# Patient Record
Sex: FEMALE | Race: WHITE | NOT HISPANIC OR LATINO | Employment: OTHER | URBAN - NONMETROPOLITAN AREA
[De-identification: names, ages, dates, MRNs, and addresses within clinical notes are randomized per-mention and may not be internally consistent; named-entity substitution may affect disease eponyms.]

---

## 2022-04-08 ENCOUNTER — TELEPHONE (OUTPATIENT)
Dept: UROLOGY | Facility: CLINIC | Age: 77
End: 2022-04-08

## 2023-08-11 DIAGNOSIS — G89.4 CHRONIC PAIN SYNDROME: Primary | ICD-10-CM

## 2023-08-11 RX ORDER — HYDROCODONE BITARTRATE AND ACETAMINOPHEN 5; 325 MG/1; MG/1
1 TABLET ORAL EVERY 4 HOURS PRN
Qty: 120 TABLET | Refills: 0 | Status: SHIPPED | OUTPATIENT
Start: 2023-08-11

## 2023-08-11 RX ORDER — GABAPENTIN 100 MG/1
100 CAPSULE ORAL EVERY 8 HOURS
Qty: 90 CAPSULE | Refills: 5 | Status: SHIPPED | OUTPATIENT
Start: 2023-08-11

## 2023-08-11 RX ORDER — LORAZEPAM 0.5 MG/1
0.5 TABLET ORAL EVERY 8 HOURS PRN
Qty: 90 TABLET | Refills: 0 | Status: SHIPPED | OUTPATIENT
Start: 2023-08-11

## 2023-08-15 ENCOUNTER — NURSING HOME (OUTPATIENT)
Dept: INTERNAL MEDICINE | Facility: CLINIC | Age: 78
End: 2023-08-15
Payer: MEDICARE

## 2023-08-15 DIAGNOSIS — S72.002S: ICD-10-CM

## 2023-08-15 DIAGNOSIS — F41.1 ANXIETY, GENERALIZED: ICD-10-CM

## 2023-08-15 DIAGNOSIS — D51.9 ANEMIA DUE TO VITAMIN B12 DEFICIENCY, UNSPECIFIED B12 DEFICIENCY TYPE: ICD-10-CM

## 2023-08-15 DIAGNOSIS — D51.8 VITAMIN B12 DEFICIENCY (DIETARY) ANEMIA: ICD-10-CM

## 2023-08-15 DIAGNOSIS — G60.9 PERIPHERAL NEUROPATHY, IDIOPATHIC: ICD-10-CM

## 2023-08-15 DIAGNOSIS — N30.00 ACUTE CYSTITIS WITHOUT HEMATURIA: ICD-10-CM
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
## 2023-08-15 DIAGNOSIS — G89.4 CHRONIC PAIN SYNDROME: Primary | ICD-10-CM

## 2023-08-15 DIAGNOSIS — F33.1 MAJOR DEPRESSIVE DISORDER, RECURRENT, MODERATE: ICD-10-CM

## 2023-08-15 DIAGNOSIS — E55.9 VITAMIN D DEFICIENCY: ICD-10-CM

## 2023-08-15 DIAGNOSIS — I10 HYPERTENSION, ESSENTIAL: ICD-10-CM
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
## 2023-08-15 NOTE — PROGRESS NOTES
Nursing Home History and Physical Note      Kristian Balderas MD  [x]  [address], [address]  [place] [address]  Phone: [phone]  Fax: [phone]     PATIENT NAME: Ying Brock                                                                          YOB: 1945            DATE OF SERVICE: 08/15/2023  FACILITY:   [] [facility]   [x] [facility]  [] [facility]  [] Other      HISTORY OF PRESENT ILLNESS: Patient is a pleasant 78-year-old female whose a fairly good historian but she is on sure of certain dates, she says she is fallen twice and she ended up having a hip fracture with repair she went home and had another fall and bruised her left hip really bad, she also has some bruises on her face her legs and her arms, she tells me she has a PICC line in because of her bladder infection, she says she lives with her daughter and her family, she tells me she used to smoke and drink some beer but she does not do that anymore      PAST MEDICAL & SURGICAL HISTORY:   Breast cancer, right mastectomy  Hypertension  Anxiety depression  Hypertension  Recent left total hip arthroplasty  Previous cholecystectomy,  Recurrent falls  UTIs    MEDICATIONS:  I have reviewed and reconciled the patients medication list in the patients chart at the skilled nursing facility today.      ALLERGIES:  No known drug allergies     SOCIAL HISTORY:  Social History     Socioeconomic History    Marital status:    , She lives with her daughter and family she does not smoke or drink currently, she is     FAMILY HISTORY: Family history reviewed but not contributory to this admission      PHYSICAL EXAMINATION:   VITAL SIGNS: 130/72 sat 95% on room air pulse 72 respiratory rate 18 temperature 97.2    PHYSICAL EXAM:, She is a frail elderly appearing female who is in no distress she is talkative alert but her history is poor, she has  multiple bruises on her upper extremities bilaterally she has bruising to the left hip and left lateral thigh area, she has a bandage over the left lateral upper thigh just below the iliac crest on the left side, she has bruises to her left knee area and leg, her lungs are clear with a few scattered crackles her cardiac exam reveals a regular rhythm she is alert and oriented x2 she can move all 4 extremities to my command her abdomen is soft nontender throat is clear neck is supple, she is PET pleasant but slightly confused with some of her history      RECORDS REVIEW:   Orders Reviewed.  Labs Reviewed.      ICD-10-CM ICD-9-CM   1. Chronic pain syndrome  G89.4 338.4   2. Fall, sequela  W19.XXXS 909.4     E929.3   3. Fracture, hip, left, sequela  S72.002S 905.3   4. Hypertension, essential  I10 401.9   5. VIT D DEF  E55.9 268.9   6. Vitamin B12 deficiency (dietary) anemia  D51.8 281.1   7. JOSE LUIS NEUR IDOPA  G60.9 356.9   8. Acute cystitis without hematuria  N30.00 595.0   9. Major depressive disorder, recurrent, moderate  F33.1 296.32   10. Anxiety, generalized  F41.1 300.02   11. Anemia due to vitamin B12 deficiency, unspecified B12 deficiency type  D51.9 281.1       ASSESSMENT/PLAN    Diagnoses and all orders for this visit:    1. Chronic pain syndrome (Primary)    2. Fall, sequela    3. Fracture, hip, left, sequela    4. Hypertension, essential    5. VIT D DEF    6. Vitamin B12 deficiency (dietary) anemia    7. JOSE LUIS NEUR IDOPA    8. Acute cystitis without hematuria    9. Major depressive disorder, recurrent, moderate    10. Anxiety, generalized    11. Anemia due to vitamin B12 deficiency, unspecified B12 deficiency type    Total hip arthroplasty left    Anemia, hemoglobin 9.7 hematocrit 28.8 August 10, 2023,    Severe vitamin B12 deficiency, will replace has been started on B12 injections monthly,    UTI, with PICC line in place finishing up course of antibiotic ertapenem to stop August 18, 2023    Anxiety  depression continues pRosac 20 mg daily, Ativan 0.5 mg every 8 hours as needed for the next couple days supposed to stop as of today August 15, 2023    Peripheral neuropathy continues on gabapentin 100 mg every 8 hours    Vitamin D deficiency moderate to severe we will replace continues on vitamin D replacement now    Falls, recurrent, previous hip fracture and repair, continues on hydrocodone every 4 hours as needed    Hypertension continues on lisinopril 40 mg daily, metoprolol 25 mg half a tablet twice a day    Previous cholecystectomy, mastectomy right sided, breast cancer    Remote tobacco and alcohol use    Pain is continue rehab supplement her vitamin deficiencies monitor her electrolytes, we get her off medication that she does not need such as her gabapentin,    Kristian Balderas MD

## 2023-08-28 ENCOUNTER — APPOINTMENT (OUTPATIENT)
Dept: GENERAL RADIOLOGY | Facility: HOSPITAL | Age: 78
DRG: 551 | End: 2023-08-28
Payer: MEDICARE

## 2023-08-28 ENCOUNTER — APPOINTMENT (OUTPATIENT)
Dept: CT IMAGING | Facility: HOSPITAL | Age: 78
DRG: 551 | End: 2023-08-28
Payer: MEDICARE

## 2023-08-28 ENCOUNTER — HOSPITAL ENCOUNTER (INPATIENT)
Facility: HOSPITAL | Age: 78
LOS: 3 days | Discharge: NURSING, REHAB OR LONG-TERM CARE | DRG: 551 | End: 2023-08-31
Attending: EMERGENCY MEDICINE | Admitting: INTERNAL MEDICINE
Payer: MEDICARE

## 2023-08-28 DIAGNOSIS — Z78.9 DECREASED ACTIVITIES OF DAILY LIVING (ADL): ICD-10-CM

## 2023-08-28 DIAGNOSIS — R26.2 DIFFICULTY WALKING: ICD-10-CM

## 2023-08-28 DIAGNOSIS — S32.10XA CLOSED FRACTURE OF SACRUM, UNSPECIFIED PORTION OF SACRUM, INITIAL ENCOUNTER: ICD-10-CM
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
## 2023-08-28 DIAGNOSIS — R41.82 ALTERED MENTAL STATUS, UNSPECIFIED ALTERED MENTAL STATUS TYPE: Primary | ICD-10-CM

## 2023-08-28 LAB
ALBUMIN SERPL-MCNC: 4.4 G/DL (ref 3.5–5.2)
ALBUMIN/GLOB SERPL: 1.4 G/DL
ALP SERPL-CCNC: 171 U/L (ref 39–117)
ALT SERPL W P-5'-P-CCNC: 9 U/L (ref 1–33)
ANION GAP SERPL CALCULATED.3IONS-SCNC: 9.1 MMOL/L (ref 5–15)
APTT PPP: 28.5 SECONDS (ref 78–95.9)
AST SERPL-CCNC: 19 U/L (ref 1–32)
BACTERIA UR QL AUTO: ABNORMAL /HPF
BASOPHILS # BLD AUTO: 0.06 10*3/MM3 (ref 0–0.2)
BASOPHILS NFR BLD AUTO: 0.9 % (ref 0–1.5)
BILIRUB SERPL-MCNC: 1.2 MG/DL (ref 0–1.2)
BILIRUB UR QL STRIP: NEGATIVE
BUN SERPL-MCNC: 28 MG/DL (ref 8–23)
BUN/CREAT SERPL: 25.5 (ref 7–25)
CALCIUM SPEC-SCNC: 9.5 MG/DL (ref 8.6–10.5)
CHLORIDE SERPL-SCNC: 101 MMOL/L (ref 98–107)
CLARITY UR: CLEAR
CO2 SERPL-SCNC: 26.9 MMOL/L (ref 22–29)
COLOR UR: YELLOW
CREAT SERPL-MCNC: 1.1 MG/DL (ref 0.57–1)
D-LACTATE SERPL-SCNC: 0.9 MMOL/L (ref 0.5–2)
DEPRECATED RDW RBC AUTO: 44.5 FL (ref 37–54)
EGFRCR SERPLBLD CKD-EPI 2021: 52.2 ML/MIN/1.73
EOSINOPHIL # BLD AUTO: 0.31 10*3/MM3 (ref 0–0.4)
EOSINOPHIL NFR BLD AUTO: 4.6 % (ref 0.3–6.2)
ERYTHROCYTE [DISTWIDTH] IN BLOOD BY AUTOMATED COUNT: 13.1 % (ref 12.3–15.4)
GEN 5 2HR TROPONIN T REFLEX: 18 NG/L
GLOBULIN UR ELPH-MCNC: 3.1 GM/DL
GLUCOSE SERPL-MCNC: 101 MG/DL (ref 65–99)
GLUCOSE UR STRIP-MCNC: NEGATIVE MG/DL
HCT VFR BLD AUTO: 34.8 % (ref 34–46.6)
HGB BLD-MCNC: 12.1 G/DL (ref 12–15.9)
HGB UR QL STRIP.AUTO: NEGATIVE
HOLD SPECIMEN: NORMAL
HOLD SPECIMEN: NORMAL
HYALINE CASTS UR QL AUTO: ABNORMAL /LPF
IMM GRANULOCYTES # BLD AUTO: 0.01 10*3/MM3 (ref 0–0.05)
IMM GRANULOCYTES NFR BLD AUTO: 0.1 % (ref 0–0.5)
INR PPP: 1.03 (ref 0.86–1.15)
KETONES UR QL STRIP: NEGATIVE
LEUKOCYTE ESTERASE UR QL STRIP.AUTO: ABNORMAL
LIPASE SERPL-CCNC: 27 U/L (ref 13–60)
LYMPHOCYTES # BLD AUTO: 1.69 10*3/MM3 (ref 0.7–3.1)
LYMPHOCYTES NFR BLD AUTO: 24.9 % (ref 19.6–45.3)
MCH RBC QN AUTO: 32.4 PG (ref 26.6–33)
MCHC RBC AUTO-ENTMCNC: 34.8 G/DL (ref 31.5–35.7)
MCV RBC AUTO: 93.3 FL (ref 79–97)
MONOCYTES # BLD AUTO: 0.51 10*3/MM3 (ref 0.1–0.9)
MONOCYTES NFR BLD AUTO: 7.5 % (ref 5–12)
NEUTROPHILS NFR BLD AUTO: 4.2 10*3/MM3 (ref 1.7–7)
NEUTROPHILS NFR BLD AUTO: 62 % (ref 42.7–76)
NITRITE UR QL STRIP: NEGATIVE
NRBC BLD AUTO-RTO: 0 /100 WBC (ref 0–0.2)
NT-PROBNP SERPL-MCNC: 455.1 PG/ML (ref 0–1800)
PH UR STRIP.AUTO: 5.5 [PH] (ref 5–8)
PLATELET # BLD AUTO: 195 10*3/MM3 (ref 140–450)
PMV BLD AUTO: 8.5 FL (ref 6–12)
POTASSIUM SERPL-SCNC: 4.4 MMOL/L (ref 3.5–5.2)
PROT SERPL-MCNC: 7.5 G/DL (ref 6–8.5)
PROT UR QL STRIP: NEGATIVE
PROTHROMBIN TIME: 13.6 SECONDS (ref 11.8–14.9)
RBC # BLD AUTO: 3.73 10*6/MM3 (ref 3.77–5.28)
RBC # UR STRIP: ABNORMAL /HPF
REF LAB TEST METHOD: ABNORMAL
SODIUM SERPL-SCNC: 137 MMOL/L (ref 136–145)
SP GR UR STRIP: 1.01 (ref 1–1.03)
SQUAMOUS #/AREA URNS HPF: ABNORMAL /HPF
TROPONIN T DELTA: 2 NG/L
TROPONIN T SERPL HS-MCNC: 16 NG/L
UROBILINOGEN UR QL STRIP: ABNORMAL
WBC # UR STRIP: ABNORMAL /HPF
WBC NRBC COR # BLD: 6.78 10*3/MM3 (ref 3.4–10.8)
WHOLE BLOOD HOLD COAG: NORMAL
WHOLE BLOOD HOLD SPECIMEN: NORMAL

## 2023-08-28 PROCEDURE — 85610 PROTHROMBIN TIME: CPT | Performed by: NURSE PRACTITIONER

## 2023-08-28 PROCEDURE — 81001 URINALYSIS AUTO W/SCOPE: CPT

## 2023-08-28 PROCEDURE — 85025 COMPLETE CBC W/AUTO DIFF WBC: CPT | Performed by: EMERGENCY MEDICINE

## 2023-08-28 PROCEDURE — 99285 EMERGENCY DEPT VISIT HI MDM: CPT

## 2023-08-28 PROCEDURE — 73502 X-RAY EXAM HIP UNI 2-3 VIEWS: CPT

## 2023-08-28 PROCEDURE — 80053 COMPREHEN METABOLIC PANEL: CPT | Performed by: NURSE PRACTITIONER

## 2023-08-28 PROCEDURE — 83690 ASSAY OF LIPASE: CPT | Performed by: NURSE PRACTITIONER

## 2023-08-28 PROCEDURE — 73130 X-RAY EXAM OF HAND: CPT

## 2023-08-28 PROCEDURE — 70450 CT HEAD/BRAIN W/O DYE: CPT

## 2023-08-28 PROCEDURE — 85730 THROMBOPLASTIN TIME PARTIAL: CPT | Performed by: NURSE PRACTITIONER

## 2023-08-28 PROCEDURE — 73030 X-RAY EXAM OF SHOULDER: CPT

## 2023-08-28 PROCEDURE — 84484 ASSAY OF TROPONIN QUANT: CPT | Performed by: EMERGENCY MEDICINE

## 2023-08-28 PROCEDURE — 83880 ASSAY OF NATRIURETIC PEPTIDE: CPT | Performed by: NURSE PRACTITIONER

## 2023-08-28 PROCEDURE — 25010000002 ENOXAPARIN PER 10 MG: Performed by: INTERNAL MEDICINE

## 2023-08-28 PROCEDURE — 83605 ASSAY OF LACTIC ACID: CPT | Performed by: NURSE PRACTITIONER

## 2023-08-28 PROCEDURE — 72192 CT PELVIS W/O DYE: CPT

## 2023-08-28 PROCEDURE — 73110 X-RAY EXAM OF WRIST: CPT

## 2023-08-28 PROCEDURE — 93005 ELECTROCARDIOGRAM TRACING: CPT | Performed by: EMERGENCY MEDICINE

## 2023-08-28 PROCEDURE — 71046 X-RAY EXAM CHEST 2 VIEWS: CPT

## 2023-08-28 PROCEDURE — 36415 COLL VENOUS BLD VENIPUNCTURE: CPT

## 2023-08-28 RX ORDER — CHOLESTYRAMINE LIGHT 4 G/5.7G
4 POWDER, FOR SUSPENSION ORAL DAILY
COMMUNITY

## 2023-08-28 RX ORDER — SODIUM CHLORIDE 0.9 % (FLUSH) 0.9 %
10 SYRINGE (ML) INJECTION AS NEEDED
Status: DISCONTINUED | OUTPATIENT
Start: 2023-08-28 | End: 2023-08-31 | Stop reason: HOSPADM

## 2023-08-28 RX ORDER — ACETAMINOPHEN 325 MG/1
650 TABLET ORAL EVERY 4 HOURS PRN
Status: DISCONTINUED | OUTPATIENT
Start: 2023-08-28 | End: 2023-08-31 | Stop reason: HOSPADM

## 2023-08-28 RX ORDER — CYANOCOBALAMIN 1000 UG/ML
1000 INJECTION, SOLUTION INTRAMUSCULAR; SUBCUTANEOUS
COMMUNITY

## 2023-08-28 RX ORDER — LORAZEPAM 0.5 MG/1
0.5 TABLET ORAL 3 TIMES DAILY
COMMUNITY
End: 2023-08-28

## 2023-08-28 RX ORDER — CYCLOBENZAPRINE HCL 5 MG
5 TABLET ORAL 3 TIMES DAILY
COMMUNITY
End: 2023-08-28

## 2023-08-28 RX ORDER — SODIUM CHLORIDE 9 MG/ML
75 INJECTION, SOLUTION INTRAVENOUS CONTINUOUS
Status: DISCONTINUED | OUTPATIENT
Start: 2023-08-28 | End: 2023-08-30

## 2023-08-28 RX ORDER — FLUOXETINE HYDROCHLORIDE 20 MG/1
20 CAPSULE ORAL DAILY
COMMUNITY

## 2023-08-28 RX ORDER — ONDANSETRON 2 MG/ML
4 INJECTION INTRAMUSCULAR; INTRAVENOUS EVERY 6 HOURS PRN
Status: DISCONTINUED | OUTPATIENT
Start: 2023-08-28 | End: 2023-08-31 | Stop reason: HOSPADM

## 2023-08-28 RX ORDER — LISINOPRIL 40 MG/1
40 TABLET ORAL DAILY
COMMUNITY

## 2023-08-28 RX ORDER — HYDROCODONE BITARTRATE AND ACETAMINOPHEN 5; 325 MG/1; MG/1
1 TABLET ORAL EVERY 4 HOURS PRN
Status: DISCONTINUED | OUTPATIENT
Start: 2023-08-28 | End: 2023-08-31 | Stop reason: HOSPADM

## 2023-08-28 RX ORDER — SODIUM CHLORIDE 9 MG/ML
40 INJECTION, SOLUTION INTRAVENOUS AS NEEDED
Status: DISCONTINUED | OUTPATIENT
Start: 2023-08-28 | End: 2023-08-31 | Stop reason: HOSPADM

## 2023-08-28 RX ORDER — ACETAMINOPHEN 325 MG/1
650 TABLET ORAL EVERY 6 HOURS PRN
COMMUNITY

## 2023-08-28 RX ORDER — ENOXAPARIN SODIUM 100 MG/ML
40 INJECTION SUBCUTANEOUS NIGHTLY
Status: DISCONTINUED | OUTPATIENT
Start: 2023-08-28 | End: 2023-08-31 | Stop reason: HOSPADM

## 2023-08-28 RX ORDER — SODIUM CHLORIDE 0.9 % (FLUSH) 0.9 %
10 SYRINGE (ML) INJECTION EVERY 12 HOURS SCHEDULED
Status: DISCONTINUED | OUTPATIENT
Start: 2023-08-28 | End: 2023-08-31 | Stop reason: HOSPADM

## 2023-08-28 RX ADMIN — Medication 10 ML: at 23:11

## 2023-08-28 RX ADMIN — SODIUM CHLORIDE 500 ML: 9 INJECTION, SOLUTION INTRAVENOUS at 17:33

## 2023-08-28 RX ADMIN — ENOXAPARIN SODIUM 40 MG: 100 INJECTION SUBCUTANEOUS at 23:11

## 2023-08-28 RX ADMIN — SODIUM CHLORIDE 75 ML/HR: 9 INJECTION, SOLUTION INTRAVENOUS at 23:11

## 2023-08-28 RX ADMIN — HYDROCODONE BITARTRATE AND ACETAMINOPHEN 1 TABLET: 5; 325 TABLET ORAL at 23:11

## 2023-08-28 NOTE — ED PROVIDER NOTES
Time: 4:19 PM EDT  Date of encounter:  8/28/2023  Independent Historian/Clinical History and Information was obtained by:   Patient and Family    History is limited by: Altered Mental Status    Chief Complaint: Fall with hip and head injury.      History of Present Illness:  Patient is a 78 y.o. year old female who presents to the emergency department for evaluation of a fall with hip and head injury.  This patient was recently admitted to a different facility for cystitis, fall, hypokalemia, and hypertension.  The patient was then sent to [facility] rehabilitation facility and apparently had a fall last night.  Since that time the patient has had altered mental status.  The patient's son is present and states that she normally is alert and oriented however now she has been seeing people and children walking in front of her who are not there and she is also been confused.  He states that the last time she was like that she had a urinary tract infection.  The patient is had no fever chills cough vomiting or diarrhea.  She is on several different sedating medications.    HPI    Patient Care Team  Primary Care Provider: Brice Noble    Past Medical History:     No Known Allergies  Past Medical History:   Diagnosis Date    Calculus of kidney     Constipation     Depression     Hypertension     Hypokalemia     Mild cognitive impairment of uncertain or unknown etiology     Muscle spasm of back     Muscle weakness     Unsteadiness on feet     Unsteady gait     UTI (urinary tract infection)     Vitamin D3 deficiency      History reviewed. No pertinent surgical history.  History reviewed. No pertinent family history.    Home Medications:  Prior to Admission medications    Medication Sig Start Date End Date Taking? Authorizing Provider   gabapentin (NEURONTIN) 100 MG capsule Take 1 capsule by mouth Every 8 (Eight) Hours. 8/11/23   Kristian Balderas MD   HYDROcodone-acetaminophen (NORCO) 5-325 MG per tablet Take 1 tablet  "by mouth Every 4 (Four) Hours As Needed for Moderate Pain. 8/11/23   Kristian Balderas MD   LORazepam (Ativan) 0.5 MG tablet Take 1 tablet by mouth Every 8 (Eight) Hours As Needed for Anxiety. 8/11/23   Kristian Balderas MD        Social History:   Social History     Tobacco Use    Smoking status: Never    Smokeless tobacco: Never   Vaping Use    Vaping Use: Never used   Substance Use Topics    Drug use: Never         Review of Systems:  Review of Systems   Unable to perform ROS: Mental status change      Physical Exam:  /62 (BP Location: Left arm, Patient Position: Lying)   Pulse 75   Temp 98.1 °F (36.7 °C) (Oral)   Resp 18   Ht 167.6 cm (66\")   Wt 58 kg (127 lb 13.9 oz)   SpO2 97%   BMI 20.64 kg/m²     Physical Exam  Vitals and nursing note reviewed.   Constitutional:       General: She is not in acute distress.     Appearance: She is ill-appearing. She is not toxic-appearing.   HENT:      Head: Normocephalic.      Comments: There are varying stages of ecchymosis noted to the left forehead and left periorbital region.     Right Ear: External ear normal.      Left Ear: External ear normal.      Nose: Nose normal.      Mouth/Throat:      Mouth: Mucous membranes are moist.   Eyes:      General: No scleral icterus.     Extraocular Movements: Extraocular movements intact.      Pupils: Pupils are equal, round, and reactive to light.   Cardiovascular:      Rate and Rhythm: Normal rate and regular rhythm.      Pulses: Normal pulses.      Heart sounds: Normal heart sounds.   Pulmonary:      Effort: Pulmonary effort is normal. No respiratory distress.      Breath sounds: Normal breath sounds.   Abdominal:      General: Abdomen is flat.      Palpations: Abdomen is soft.      Tenderness: There is no abdominal tenderness.   Musculoskeletal:         General: Tenderness present.      Cervical back: Normal range of motion and neck supple.      Comments: Tenderness to the left hip and right elbow. "   Skin:     General: Skin is warm and dry.      Findings: Bruising present.      Comments: There is tenderness and ecchymosis noted to the left hip and pelvis region.   Neurological:      Mental Status: She is alert. She is disoriented.      Motor: Weakness present.      Comments: The patient has generalized weakness without any signs of focal weakness.                Procedures:  Procedures      Medical Decision Making:      Comorbidities that affect care:    Hypertension    External Notes reviewed:    Previous Admission Note: For hypokalemia, fall, cystitis and elevated blood pressure.      The following orders were placed and all results were independently analyzed by me:  Orders Placed This Encounter   Procedures    XR Hip With or Without Pelvis 2 - 3 View Left    XR Chest 2 View    CT Head Without Contrast    CT Pelvis Without Contrast    XR Wrist 3+ View Right    XR Hand 3+ View Right    XR Shoulder 2+ View Right    Urinalysis With Culture If Indicated - Urine, Clean Catch    Urinalysis, Microscopic Only - Urine, Clean Catch    Comprehensive Metabolic Panel    Protime-INR    aPTT    Lipase    [place] Draw    Lactic Acid, Plasma    BNP    High Sensitivity Troponin T    High Sensitivity Troponin T 2Hr    CBC Auto Differential    CBC Auto Differential    Comprehensive Metabolic Panel    Magnesium    Diet: Cardiac Diets; Healthy Heart (2-3 Na+); Texture: Regular Texture (IDDSI 7); Fluid Consistency: Thin (IDDSI 0)    Orthostatic Vitals    Pulse Oximetry, Continuous    Vital Signs    Intake & Output    Weigh Patient    Oral Care    Telemetry - Maintain IV Access    Telemetry - Place Orders & Notify Provider of Results When Patient Experiences Acute Chest Pain, Dysrhythmia or Respiratory Distress    Daily Weights    Strict Intake & Output    Code Status and Medical Interventions:    Orthopedics (on-call MD unless specified)    Hospitalist (on-call MD unless specified)    Inpatient Case Management   Consult    OT Consult: Eval & Treat    PT Consult: Eval & Treat Discharge Placement Assessment    ECG 12 Lead Rhythm Change    Consult to Wound / Ostomy Care    Insert Peripheral IV    Insert Peripheral IV    Inpatient Admission    Green Top (Gel)    Lavender Top    Gold Top - SST    Light Blue Top    CBC & Differential       Medications Given in the Emergency Department:  Medications   sodium chloride 0.9 % flush 10 mL (has no administration in time range)   sodium chloride 0.9 % flush 10 mL (10 mL Intravenous Not Given 8/29/23 0910)   sodium chloride 0.9 % flush 10 mL (has no administration in time range)   sodium chloride 0.9 % infusion 40 mL (has no administration in time range)   Enoxaparin Sodium (LOVENOX) syringe 40 mg (40 mg Subcutaneous Given 8/28/23 2311)   acetaminophen (TYLENOL) tablet 650 mg (has no administration in time range)   HYDROcodone-acetaminophen (NORCO) 5-325 MG per tablet 1 tablet (1 tablet Oral Given 8/29/23 0909)   ondansetron (ZOFRAN) injection 4 mg (has no administration in time range)   sodium chloride 0.9 % infusion (75 mL/hr Intravenous New Bag 8/28/23 2311)   cholestyramine light packet 4 g (4 g Oral Given 8/29/23 0909)   FLUoxetine (PROzac) capsule 20 mg (20 mg Oral Given 8/29/23 0909)   lisinopril (PRINIVIL,ZESTRIL) tablet 40 mg (40 mg Oral Given 8/29/23 0909)   metoprolol tartrate (LOPRESSOR) tablet 12.5 mg (12.5 mg Oral Given 8/29/23 0909)   sodium chloride 0.9 % bolus 500 mL (0 mL Intravenous Stopped 8/28/23 1859)        ED Course:         Labs:    Lab Results (last 24 hours)       Procedure Component Value Units Date/Time    Protime-INR [[id number]]  (Normal) Collected: 08/28/23 1707    Specimen: Blood Updated: 08/28/23 1725     Protime 13.6 Seconds      INR 1.03    Narrative:      Suggested Therapeutic Ranges For Oral Anticoagulant Therapy:  Level of Therapy                      INR Target Range  Standard Dose                            2.0-3.0  High Dose                                 2.5-3.5  Patients not receiving anticoagulant  Therapy Normal Range                     0.86-1.15    aPTT [[id number]]  (Abnormal) Collected: 08/28/23 1707    Specimen: Blood Updated: 08/28/23 1725     PTT 28.5 seconds     Lactic Acid, Plasma [[id number]]  (Normal) Collected: 08/28/23 1707    Specimen: Blood Updated: 08/28/23 1727     Lactate 0.9 mmol/L     Comprehensive Metabolic Panel [[id number]]  (Abnormal) Collected: 08/28/23 1734    Specimen: Blood Updated: 08/28/23 1807     Glucose 101 mg/dL      BUN 28 mg/dL      Creatinine 1.10 mg/dL      Sodium 137 mmol/L      Potassium 4.4 mmol/L      Chloride 101 mmol/L      CO2 26.9 mmol/L      Calcium 9.5 mg/dL      Total Protein 7.5 g/dL      Albumin 4.4 g/dL      ALT (SGPT) 9 U/L      AST (SGOT) 19 U/L      Alkaline Phosphatase 171 U/L      Total Bilirubin 1.2 mg/dL      Globulin 3.1 gm/dL      A/G Ratio 1.4 g/dL      BUN/Creatinine Ratio 25.5     Anion Gap 9.1 mmol/L      eGFR 52.2 mL/min/1.73     Narrative:      GFR Normal >60  Chronic Kidney Disease <60  Kidney Failure <15    The GFR formula is only valid for adults with stable renal function between ages 18 and 70.    Lipase [[id number]]  (Normal) Collected: 08/28/23 1734    Specimen: Blood Updated: 08/28/23 1807     Lipase 27 U/L     BNP [[id number]]  (Normal) Collected: 08/28/23 1734    Specimen: Blood Updated: 08/28/23 1803     proBNP 455.1 pg/mL     Narrative:      Among patients with dyspnea, NT-proBNP is highly sensitive for the detection of acute congestive heart failure. In addition NT-proBNP of <300 pg/ml effectively rules out acute congestive heart failure with 99% negative predictive value.      High Sensitivity Troponin T [[id number]]  (Abnormal) Collected: 08/28/23 1734    Specimen: Blood Updated: 08/28/23 1806     HS Troponin T 16 ng/L     Narrative:      High Sensitive Troponin T Reference Range:  <10.0 ng/L- Negative Female for AMI  <15.0 ng/L- Negative Male for AMI  >=10 - Abnormal Female  indicating possible myocardial injury.  >=15 - Abnormal Male indicating possible myocardial injury.   Clinicians would have to utilize clinical acumen, EKG, Troponin, and serial changes to determine if it is an Acute Myocardial Infarction or myocardial injury due to an underlying chronic condition.         High Sensitivity Troponin T 2Hr [[id number]]  (Abnormal) Collected: 08/28/23 2016    Specimen: Blood Updated: 08/28/23 2043     HS Troponin T 18 ng/L      Troponin T Delta 2 ng/L     Narrative:      High Sensitive Troponin T Reference Range:  <10.0 ng/L- Negative Female for AMI  <15.0 ng/L- Negative Male for AMI  >=10 - Abnormal Female indicating possible myocardial injury.  >=15 - Abnormal Male indicating possible myocardial injury.   Clinicians would have to utilize clinical acumen, EKG, Troponin, and serial changes to determine if it is an Acute Myocardial Infarction or myocardial injury due to an underlying chronic condition.         CBC & Differential [[id number]]  (Abnormal) Collected: 08/28/23 2056    Specimen: Blood Updated: 08/28/23 2100    Narrative:      The following orders were created for panel order CBC & Differential.  Procedure                               Abnormality         Status                     ---------                               -----------         ------                     CBC Auto Differential[[id number]]        Abnormal            Final result                 Please view results for these tests on the individual orders.    CBC Auto Differential [[id number]]  (Abnormal) Collected: 08/28/23 2056    Specimen: Blood Updated: 08/28/23 2100     WBC 6.78 10*3/mm3      RBC 3.73 10*6/mm3      Hemoglobin 12.1 g/dL      Hematocrit 34.8 %      MCV 93.3 fL      MCH 32.4 pg      MCHC 34.8 g/dL      RDW 13.1 %      RDW-SD 44.5 fl      MPV 8.5 fL      Platelets 195 10*3/mm3      Neutrophil % 62.0 %      Lymphocyte % 24.9 %      Monocyte % 7.5 %      Eosinophil % 4.6 %      Basophil % 0.9 %       Immature Grans % 0.1 %      Neutrophils, Absolute 4.20 10*3/mm3      Lymphocytes, Absolute 1.69 10*3/mm3      Monocytes, Absolute 0.51 10*3/mm3      Eosinophils, Absolute 0.31 10*3/mm3      Basophils, Absolute 0.06 10*3/mm3      Immature Grans, Absolute 0.01 10*3/mm3      nRBC 0.0 /100 WBC     CBC Auto Differential [[id number]]  (Abnormal) Collected: 08/29/23 0533    Specimen: Blood from Hand, Left Updated: 08/29/23 0609     WBC 5.69 10*3/mm3      RBC 3.55 10*6/mm3      Hemoglobin 11.0 g/dL      Hematocrit 34.0 %      MCV 95.8 fL      MCH 31.0 pg      MCHC 32.4 g/dL      RDW 12.9 %      RDW-SD 44.7 fl      MPV 8.9 fL      Platelets 183 10*3/mm3      Neutrophil % 66.7 %      Lymphocyte % 22.5 %      Monocyte % 8.3 %      Eosinophil % 1.4 %      Basophil % 0.7 %      Immature Grans % 0.4 %      Neutrophils, Absolute 3.80 10*3/mm3      Lymphocytes, Absolute 1.28 10*3/mm3      Monocytes, Absolute 0.47 10*3/mm3      Eosinophils, Absolute 0.08 10*3/mm3      Basophils, Absolute 0.04 10*3/mm3      Immature Grans, Absolute 0.02 10*3/mm3      nRBC 0.0 /100 WBC     Comprehensive Metabolic Panel [[id number]]  (Abnormal) Collected: 08/29/23 0533    Specimen: Blood from Hand, Left Updated: 08/29/23 0628     Glucose 125 mg/dL      BUN 29 mg/dL      Creatinine 0.99 mg/dL      Sodium 137 mmol/L      Potassium 3.9 mmol/L      Chloride 104 mmol/L      CO2 21.3 mmol/L      Calcium 9.1 mg/dL      Total Protein 7.0 g/dL      Albumin 4.0 g/dL      ALT (SGPT) 6 U/L      AST (SGOT) 18 U/L      Alkaline Phosphatase 148 U/L      Total Bilirubin 1.8 mg/dL      Globulin 3.0 gm/dL      A/G Ratio 1.3 g/dL      BUN/Creatinine Ratio 29.3     Anion Gap 11.7 mmol/L      eGFR 59.2 mL/min/1.73     Narrative:      GFR Normal >60  Chronic Kidney Disease <60  Kidney Failure <15    The GFR formula is only valid for adults with stable renal function between ages 18 and 70.    Magnesium [[id number]]  (Normal) Collected: 08/29/23 0425    Specimen: Blood from  Hand, Left Updated: 08/29/23 0628     Magnesium 2.2 mg/dL              Imaging:    XR Chest 2 View    Result Date: 8/28/2023  PROCEDURE: XR CHEST 2 VW  COMPARISON: None  INDICATIONS: HYPOXIA  FINDINGS:  The heart is normal in size.  The lungs are well-expanded and free of infiltrates.  Moderate to marked thoracic and lumbar scoliosis is evident.  No fractures are visualized.  Mild degenerative spurring is evident.        No active cardiopulmonary disease is seen.     PRATIK AGRAWAL MD       Electronically Signed and Approved By: PRATIK AGRAWAL MD on 8/28/2023 at 14:44             XR Shoulder 2+ View Right    Result Date: 8/28/2023  PROCEDURE: XR SHOULDER 2+ VW RIGHT  COMPARISON: None.  INDICATIONS: 78-YEAR-OLD FEMALE WHO FELL; SHE C/O RIGHT SHOULDER PAIN.  FINDINGS:  Four views were obtained.  No acute fracture or acute malalignment is identified involving the right shoulder girdle.  There is generalized osteopenia.  Mild-to-moderate degenerative changes involve the right shoulder.  Moderate-to-severe dextroscoliosis involves the cervicothoracic spine.  Degenerative changes are seen throughout the imaged spine.  A single surgical clip is projected over the right axillary region.  No right pneumothorax is seen.  External artifacts are noted.  If symptoms or clinical concerns persist, consider imaging follow-up.        No acute fracture or acute malalignment is identified.     Please note that portions of this note were completed with a voice recognition program.  CARRIE WILLOUGHBY JR, MD       Electronically Signed and Approved By: CARRIE WILLOUGHBY JR, MD on 8/28/2023 at 22:49              XR Wrist 3+ View Right    Result Date: 8/28/2023  PROCEDURE: XR WRIST 3+ VW RIGHT  COMPARISON: [facility], CR, XR HAND 3+ VW RIGHT, 8/28/2023, 20:35.  INDICATIONS: FELL COMPLAINS OF RIGHT WRIST AND HAND PAIN WITH BRUISING TO MEDIAL WRIST  FINDINGS:  Dorsal wrist soft tissue swelling is noted.  The posterior lunate fracture  noted on the hand x-ray previously is not as well demonstrated by this exam.  Osseous structures otherwise appear unremarkable.  The bones appear diffusely osteopenic.        1. Tiny fracture fragment off the posterior aspect of the lunate better noted on the hand radiographs. 2. Dorsal wrist soft tissue swelling 3. Diffuse osteopenia      Garcia Lopez M.D.       Electronically Signed and Approved By: Garcia Lopez M.D. on 8/28/2023 at 21:26             XR Hand 3+ View Right    Result Date: 8/28/2023  PROCEDURE: XR HAND 3+ VW RIGHT  COMPARISON: [facility], CR, XR WRIST 3+ VW RIGHT, 8/28/2023, 20:35.  INDICATIONS: FELL COMPLAINS OF RIGHT WRIST AND HAND PAIN WITH BRUISING TO MEDIAL WRIST  FINDINGS:  Dorsal wrist soft tissue swelling is noted.  The bones appear diffusely osteopenic.  There is a 0.3 cm fracture fragment off the posterior aspect of the lunate.  No other fracture is seen.  Alignment is anatomic.        1. 0.3 cm fracture fragment off the posterior aspect of the lunate 2. Dorsal wrist soft tissue swelling      Garcia Lopez M.D.       Electronically Signed and Approved By: Garcia Lopez M.D. on 8/28/2023 at 21:25             CT Head Without Contrast    Result Date: 8/28/2023  PROCEDURE: CT HEAD WO CONTRAST  COMPARISON:  None INDICATIONS: Trauma altered mental status, fall  PROTOCOL:   Standard imaging protocol performed    RADIATION:   DLP: 1082.2mGy*cm   MA and/or KV was adjusted to minimize radiation dose.     TECHNIQUE: After obtaining the patient's consent, CT images were obtained without non-ionic intravenous contrast material.  FINDINGS:  Mild low density in the cerebral white matter is consistent with small vessel ischemic disease.  The ventricles and cisterns are normal in size and configuration  The visualized extracranial soft tissues appear normal.  Small bilateral mastoid effusions are noted.  No calvarial fracture is identified.        1. Mild small vessel ischemic changes in  the white matter 2. No calvarial fracture or intracranial hemorrhage 3. Small bilateral mastoid effusions     Garcia Lopez M.D.       Electronically Signed and Approved By: Garcia Lopez M.D. on 8/28/2023 at 18:05             CT Pelvis Without Contrast    Result Date: 8/28/2023  PROCEDURE: CT PELVIS WO CONTRAST  COMPARISON: None  INDICATIONS: Trauma, fall bilat hip pain  PROTOCOL:   Standard imaging protocol performed    RADIATION:   DLP: 630.6mGy*cm   Automated exposure control was utilized to minimize radiation dose.  TECHNIQUE: After obtaining the patient's consent, CT images were created without intravenous contrast.  Multiplanar imaging was performed.  FINDINGS:  A dynamic hip screw has been placed on the left.  The distal aspect of the intramedullary james is not imaged on this exam.  There appears to be a largely healed left femoral inter trochanteric fracture.  There is left convexity mid lumbar scoliosis.  There is a vaguely seen transverse fracture of the inferior sacrum bilaterally.  Alignment is near anatomic.  Osseous structures otherwise appear unremarkable.  In the subcutaneous fat lateral to the left iliac wing is a 6.1 cm fluid collection consistent with a hematoma.  Soft tissue contusion is noted more inferiorly.  Visualized pelvic viscera appear normal.        1. Nondisplaced transverse fracture of the inferior sacrum bilaterally.  This is age indeterminate.  Correlate with any previous available pelvic CT results. 2. Previous left femoral inter trochanteric fracture which appears largely healed following open reduction internal fixation 3. Soft tissue hematoma/contusion along the left lateral aspect of the pelvis, as above     Garcia Lopez M.D.       Electronically Signed and Approved By: Garcia Lopez M.D. on 8/28/2023 at 18:25                Differential Diagnosis and Discussion:    Altered Mental Status: Based on the patient's signs and symptoms, differential diagnosis includes but is not  limited to meningitis, stroke, sepsis, subarachnoid hemorrhage, intracranial bleeding, encephalitis, and metabolic encephalopathy.    All labs were reviewed and interpreted by me.    MDM     Amount and/or Complexity of Data Reviewed  Clinical lab tests: reviewed  Tests in the radiology section of CPT®: reviewed  Tests in the medicine section of CPT®: reviewed             Patient Care Considerations:    MRI: I considered ordering an MRI however CT is adequate for ED evaluation and history of fall      Consultants/Shared Management Plan:    Hospitalist: I have discussed the case with hospitalist who agrees to accept the patient for admission.    Social Determinants of Health:    Patient is unable to carry out activities of daily life. Escalation of care is necessary.       Disposition and Care Coordination:    Admit:   Through independent evaluation of the patient's history, physical, and imperical data, the patient meets criteria for observation/admission to the hospital.        Final diagnoses:   Altered mental status, unspecified altered mental status type   Fall, initial encounter   Closed fracture of sacrum, unspecified portion of sacrum, initial encounter        ED Disposition       ED Disposition   Decision to Admit    Condition   --    Comment   Level of Care: Telemetry [5]   Diagnosis: AMS (altered mental status) [[id number]]   Certification: I Certify That Inpatient Hospital Services Are Medically Necessary For Greater Than 2 Midnights                 This medical record created using voice recognition software.             José Manuel Oconnor, DO  08/29/23 115

## 2023-08-29 LAB
ALBUMIN SERPL-MCNC: 4 G/DL (ref 3.5–5.2)
ALBUMIN/GLOB SERPL: 1.3 G/DL
ALP SERPL-CCNC: 148 U/L (ref 39–117)
ALT SERPL W P-5'-P-CCNC: 6 U/L (ref 1–33)
ANION GAP SERPL CALCULATED.3IONS-SCNC: 11.7 MMOL/L (ref 5–15)
AST SERPL-CCNC: 18 U/L (ref 1–32)
BASOPHILS # BLD AUTO: 0.04 10*3/MM3 (ref 0–0.2)
BASOPHILS NFR BLD AUTO: 0.7 % (ref 0–1.5)
BILIRUB SERPL-MCNC: 1.8 MG/DL (ref 0–1.2)
BUN SERPL-MCNC: 29 MG/DL (ref 8–23)
BUN/CREAT SERPL: 29.3 (ref 7–25)
CALCIUM SPEC-SCNC: 9.1 MG/DL (ref 8.6–10.5)
CHLORIDE SERPL-SCNC: 104 MMOL/L (ref 98–107)
CO2 SERPL-SCNC: 21.3 MMOL/L (ref 22–29)
CREAT SERPL-MCNC: 0.99 MG/DL (ref 0.57–1)
DEPRECATED RDW RBC AUTO: 44.7 FL (ref 37–54)
EGFRCR SERPLBLD CKD-EPI 2021: 59.2 ML/MIN/1.73
EOSINOPHIL # BLD AUTO: 0.08 10*3/MM3 (ref 0–0.4)
EOSINOPHIL NFR BLD AUTO: 1.4 % (ref 0.3–6.2)
ERYTHROCYTE [DISTWIDTH] IN BLOOD BY AUTOMATED COUNT: 12.9 % (ref 12.3–15.4)
GLOBULIN UR ELPH-MCNC: 3 GM/DL
GLUCOSE SERPL-MCNC: 125 MG/DL (ref 65–99)
HCT VFR BLD AUTO: 34 % (ref 34–46.6)
HGB BLD-MCNC: 11 G/DL (ref 12–15.9)
IMM GRANULOCYTES # BLD AUTO: 0.02 10*3/MM3 (ref 0–0.05)
IMM GRANULOCYTES NFR BLD AUTO: 0.4 % (ref 0–0.5)
LYMPHOCYTES # BLD AUTO: 1.28 10*3/MM3 (ref 0.7–3.1)
LYMPHOCYTES NFR BLD AUTO: 22.5 % (ref 19.6–45.3)
MAGNESIUM SERPL-MCNC: 2.2 MG/DL (ref 1.6–2.4)
MCH RBC QN AUTO: 31 PG (ref 26.6–33)
MCHC RBC AUTO-ENTMCNC: 32.4 G/DL (ref 31.5–35.7)
MCV RBC AUTO: 95.8 FL (ref 79–97)
MONOCYTES # BLD AUTO: 0.47 10*3/MM3 (ref 0.1–0.9)
MONOCYTES NFR BLD AUTO: 8.3 % (ref 5–12)
NEUTROPHILS NFR BLD AUTO: 3.8 10*3/MM3 (ref 1.7–7)
NEUTROPHILS NFR BLD AUTO: 66.7 % (ref 42.7–76)
NRBC BLD AUTO-RTO: 0 /100 WBC (ref 0–0.2)
PLATELET # BLD AUTO: 183 10*3/MM3 (ref 140–450)
PMV BLD AUTO: 8.9 FL (ref 6–12)
POTASSIUM SERPL-SCNC: 3.9 MMOL/L (ref 3.5–5.2)
PROT SERPL-MCNC: 7 G/DL (ref 6–8.5)
RBC # BLD AUTO: 3.55 10*6/MM3 (ref 3.77–5.28)
SODIUM SERPL-SCNC: 137 MMOL/L (ref 136–145)
WBC NRBC COR # BLD: 5.69 10*3/MM3 (ref 3.4–10.8)

## 2023-08-29 PROCEDURE — 29075 APPL CST ELBW FNGR SHORT ARM: CPT | Performed by: STUDENT IN AN ORGANIZED HEALTH CARE EDUCATION/TRAINING PROGRAM

## 2023-08-29 PROCEDURE — 83735 ASSAY OF MAGNESIUM: CPT | Performed by: INTERNAL MEDICINE

## 2023-08-29 PROCEDURE — 80053 COMPREHEN METABOLIC PANEL: CPT | Performed by: INTERNAL MEDICINE

## 2023-08-29 PROCEDURE — 85025 COMPLETE CBC W/AUTO DIFF WBC: CPT | Performed by: INTERNAL MEDICINE

## 2023-08-29 PROCEDURE — 25010000002 ENOXAPARIN PER 10 MG: Performed by: INTERNAL MEDICINE

## 2023-08-29 PROCEDURE — 99221 1ST HOSP IP/OBS SF/LOW 40: CPT | Performed by: STUDENT IN AN ORGANIZED HEALTH CARE EDUCATION/TRAINING PROGRAM

## 2023-08-29 RX ORDER — FLUOXETINE HYDROCHLORIDE 20 MG/1
20 CAPSULE ORAL DAILY
Status: DISCONTINUED | OUTPATIENT
Start: 2023-08-29 | End: 2023-08-31 | Stop reason: HOSPADM

## 2023-08-29 RX ORDER — CHOLESTYRAMINE LIGHT 4 G/5.7G
4 POWDER, FOR SUSPENSION ORAL DAILY
Status: DISCONTINUED | OUTPATIENT
Start: 2023-08-29 | End: 2023-08-31 | Stop reason: HOSPADM

## 2023-08-29 RX ORDER — LISINOPRIL 20 MG/1
40 TABLET ORAL DAILY
Status: DISCONTINUED | OUTPATIENT
Start: 2023-08-29 | End: 2023-08-31 | Stop reason: HOSPADM

## 2023-08-29 RX ADMIN — SODIUM CHLORIDE 75 ML/HR: 9 INJECTION, SOLUTION INTRAVENOUS at 12:13

## 2023-08-29 RX ADMIN — METOPROLOL TARTRATE 12.5 MG: 25 TABLET, FILM COATED ORAL at 21:02

## 2023-08-29 RX ADMIN — LISINOPRIL 40 MG: 20 TABLET ORAL at 09:09

## 2023-08-29 RX ADMIN — ENOXAPARIN SODIUM 40 MG: 100 INJECTION SUBCUTANEOUS at 21:01

## 2023-08-29 RX ADMIN — CHOLESTYRAMINE 4 G: 4 POWDER, FOR SUSPENSION ORAL at 09:09

## 2023-08-29 RX ADMIN — HYDROCODONE BITARTRATE AND ACETAMINOPHEN 1 TABLET: 5; 325 TABLET ORAL at 09:09

## 2023-08-29 RX ADMIN — Medication 10 ML: at 21:02

## 2023-08-29 RX ADMIN — FLUOXETINE 20 MG: 20 CAPSULE ORAL at 09:09

## 2023-08-29 RX ADMIN — METOPROLOL TARTRATE 12.5 MG: 25 TABLET, FILM COATED ORAL at 09:09

## 2023-08-29 NOTE — PLAN OF CARE
Goal Outcome Evaluation:  Plan of Care Reviewed With: patient        Progress: no change  Outcome Evaluation: pt initially arrived to floor AAOx4, growing increasingly (pleasantly) confused. pt complaint of pain in right wrist

## 2023-08-29 NOTE — H&P
[facility]  Date: [facility]23   Patient Name: Ying Brock  : 1945  MRN: [id number]  Primary Care Physician:  Brice Noble  Date of admission: 2023    Subjective   Subjective     Chief Complaint: Confusion    HPI:    Ying Brock is a 78 y.o. female past medical history of depression/anxiety, chronic pain, history of MDR E. coli urinary tract infections, hypertension, mild cognitive impairment, and CKD stage IIIa who presents from nursing facility due to fall    History is difficult to obtain was primarily obtained from the ER physician and the patient's son.  She was recently admitted to hospital in [place] and found to have an E. coli UTI that was MDR.  He was discharged to rehab facility on ertapenem to treat UTI.  The patient has had a fall at the facility and was in significant amount of pain and is why she was transferred here for further evaluation.  Of note the patient also has been seeing things and was somewhat delirious.    In the emergency department the patient's vital signs are as follows: Temperature 97.8, pulse 58, respiratory is 18, blood pressure 139/81, 95% on room air.  CBC shows no abnormalities.  CMP shows a creatinine 1.1.  Mildly elevated troponins due to demand.  CT scan of the pelvis without contrast shows nondisplaced transverse fracture of the inferior sacral bilaterally.  Patient's presentation was discussed with orthopedics.  Patient to be admitted due to acute metabolic encephalopathy most likely from the fracture as well as polypharmacy.    All systems reviewed abnormalities noted above    Personal History     Past Medical History:  Depression/anxiety  History of MDR E. coli urinary tract infection  Hypertension  Chronic pain with polypharmacy  Nephrolithiasis  Mild cognitive impairment with unknown etiology  CKD stage IIIa with unknown baseline  Breast cancer    Past Surgical History:  Cholecystectomy  Mastectomy  Total hip  arthroplasty in the left    Family History:   No family history of hip fractures    Social History:   From nursing home    Home Medications:  FLUoxetine, HYDROcodone-acetaminophen, LORazepam, Vitamin D3, acetaminophen, cholestyramine light, cyanocobalamin, cyclobenzaprine, ertapenem ([employer]) MBP 1 g in 100 NS, gabapentin, lisinopril, magnesium hydroxide, metoprolol tartrate, and vitamin D3    Allergies:  No Known Allergies        Objective   Objective     Vitals:   Temp:  [97.8 °F (36.6 °C)] 97.8 °F (36.6 °C)  Heart Rate:  [57-61] 58  Resp:  [18] 18  BP: (112-139)/(68-81) 139/81    Physical Exam    Constitutional: Confused and saying things walk across the vargas   Eyes: Pupils equal, sclerae anicteric, no conjunctival injection   HENT: NCAT, mucous membranes moist   Neck: Supple, no thyromegaly, no lymphadenopathy, trachea midline   Respiratory: Clear to auscultation bilaterally, nonlabored respirations    Cardiovascular: RRR, no murmurs, rubs, or gallops, palpable pedal pulses bilaterally   Gastrointestinal: Positive bowel sounds, soft, nontender, nondistended   Musculoskeletal: Right hand shows swelling and redness.  Patient has pain in bilateral hips.   Psychiatric: Appropriate affect, cooperative   Neurologic: Oriented x 1, strength symmetric in all extremities, Cranial Nerves grossly intact to confrontation, speech clear   Skin: No rashes     Result Review    Result Review:  I have personally reviewed the results from the time of this admission to 8/28/2023 20:39 EDT and agree with these findings:  Creatinine was 1.10  Troponin was 16 and 18    CT of the pelvis shows sacral fracture nondisplaced      Assessment & Plan   Assessment / Plan     Assessment/Plan:   Acute metabolic encephalopathy  Polypharmacy  Sacral fracture is nonoperative  Hypertension  History of MDR E. Coli UTI  CKD stage IIIa    Plan:  -- Admit to hospital service  -- We will hold lorazepam, Neurontin, and muscle relaxers for now due to concern  for polypharmacy  -- May need to restart lorazepam due to concern withdrawal over the next couple days  -- PT/OT due to the sacral fracture  -- Follow-up on hand and wrist x-rays  -- Follow-up shoulder x-rays  -- Urine does not appear to be infected and just finished the treatment with ertapenem      DVT prophylaxis:  Lovenox    CODE STATUS:     Full code    Admission Status:  I believe this patient meets admission status.    Electronically signed by Philippe Shaw MD, 08/28/23, 8:39 PM EDT.

## 2023-08-29 NOTE — PLAN OF CARE
Problem: Adult Inpatient Plan of Care  Goal: Plan of Care Review  Outcome: Ongoing, Progressing  Flowsheets (Taken 8/29/2023 1606)  Progress: improving  Plan of Care Reviewed With:   patient   daughter  Outcome Evaluation: Patient alert and oriented throughout shift. VSS. Pain treated per MAR. Cast placed on right wrist this afternoon by orthopedic staff. IV fluid therapy continued. Continuing with plan of care.   Goal Outcome Evaluation:  Plan of Care Reviewed With: patient, daughter        Progress: improving  Outcome Evaluation: Patient alert and oriented throughout shift. VSS. Pain treated per MAR. Cast placed on right wrist this afternoon by orthopedic staff. IV fluid therapy continued. Continuing with plan of care.

## 2023-08-29 NOTE — SIGNIFICANT NOTE
Wound Eval / Progress Noted    TOYIN Chance     Patient Name: Ying Brock  : 1945  MRN: [id number]  Today's Date: 2023                 Admit Date: 2023    Visit Dx:    ICD-10-CM ICD-9-CM   1. Altered mental status, unspecified altered mental status type  R41.82 780.97   2. Fall, initial encounter  W19.XXXA E888.9   3. Closed fracture of sacrum, unspecified portion of sacrum, initial encounter  S32.10XA 805.6         AMS (altered mental status)        Past Medical History:   Diagnosis Date    Calculus of kidney     Constipation     Depression     Hypertension     Hypokalemia     Mild cognitive impairment of uncertain or unknown etiology     Muscle spasm of back     Muscle weakness     Unsteadiness on feet     Unsteady gait     UTI (urinary tract infection)     Vitamin D3 deficiency         History reviewed. No pertinent surgical history.      Physical Assessment:  Wound 23 2300 Right lateral knee Skin Tear (Active)   Wound Image   23 1015   Dressing Appearance open to air 23 1015   Closure None 23 1015   Base red;dry;scab;bleeding 23 1015   Red (%), Wound Tissue Color 100 23 1015   Periwound intact;dry;pink 23 1015   Periwound Temperature warm 23 1015   Periwound Skin Turgor firm 23 1015   Edges rolled/closed;open 23 1015   Drainage Characteristics/Odor serosanguineous 23 1015   Drainage Amount scant 23 1015   Care, Wound cleansed with;sterile normal saline 23 1015   Dressing Care dressing applied;silver impregnated;hydrofiber;silicone;border dressing 23 1015   Periwound Care absorptive dressing applied 23 1015       Wound 23 1201 Right anterior foot Traumatic (Active)   Wound Image   23 1015   Dressing Appearance open to air 23 1015   Closure None 23 1015   Base scab;dry;maroon/purple 23 1015   Periwound intact;dry;pink 23 1015   Periwound Temperature warm 23 1015    Periwound Skin Turgor soft 08/29/23 1015   Edges rolled/closed 08/29/23 1015   Drainage Amount none 08/29/23 1015   Care, Wound cleansed with;sterile normal saline 08/29/23 1015   Dressing Care open to air;skin barrier agent applied 08/29/23 1015   Periwound Care barrier ointment applied 08/29/23 1015        Wound Check / Follow-up:  Patient seen today for a wound consult. Family present at bedside. Daughter reports that the patient was in rehab when she was called about the patient having multiple falls.  Patient with scattered bruising all over body. Abrasion noted to the right lateral knee and foot. Wound base to right knee is red and moist with some crusted tissue present to the wound edges. Right lateral foot wound base obscured with crusted tissue. Cleansed with NS. Recommend daily dressing changes with silver impregnated hydrofiber to the wounds.   Buttocks / heels without discoloration; all tissue intact at this time. Recommend to implement / encourage patient to turn Q2H and offload heels at all times. Keep patient clean and free from all moisture.     Impression: traumatic injury, bruising    Short term goals:  regain skin integrity, pressure reduction, skin protection, moisture management, daily dressing changes.    Reena Hutn RN    8/29/2023    12:05 EDT

## 2023-08-29 NOTE — CONSULTS
[facility]   Consult Note    Patient Name: Ying Brock  : 1945  MRN: [id number]  Primary Care Physician:  Brice Noble  Referring Physician: No ref. provider found  Date of admission: 2023    Subjective   Subjective     Reason for Consult/ Chief Complaint: Fall, pelvic pain, right wrist pain    HPI:  Ying Brock is a 78 y.o. female who presented from the nursing facility after a fall.  She was recently admitted to [facility] and found to have an E. coli UTI that was multidrug resistance.  She then had a fall at her nursing facility landing onto her the left hip and right wrist.  She has had altered mental status and obtaining history is difficult.  She currently denies any pain other than the left hip and right wrist.    Review of Systems   14 Point ROS is negative except as noted above.     Personal History     Past Medical History:   Diagnosis Date   • Calculus of kidney    • Constipation    • Depression    • Hypertension    • Hypokalemia    • Mild cognitive impairment of uncertain or unknown etiology    • Muscle spasm of back    • Muscle weakness    • Unsteadiness on feet    • Unsteady gait    • UTI (urinary tract infection)    • Vitamin D3 deficiency        History reviewed. No pertinent surgical history.    Family History: family history is not on file. Otherwise pertinent FHx was reviewed and not pertinent to current issue.    Social History:  reports that she has never smoked. She has never used smokeless tobacco. She reports that she does not use drugs.    Home Medications:  FLUoxetine, HYDROcodone-acetaminophen, LORazepam, acetaminophen, cholestyramine light, cyanocobalamin, gabapentin, lisinopril, magnesium hydroxide, metoprolol tartrate, and vitamin D3    Allergies:  No Known Allergies    Objective    Objective     Vitals:   Temp:  [97.7 °F (36.5 °C)-98.8 °F (37.1 °C)] 98.6 °F (37 °C)  Heart Rate:  [57-61] 57  Resp:  [18] 18  BP: (112-157)/() 140/59    Physical  Exam:   Constitutional: Awake, alert   HENT: Atraumatic, Normocephalic   Respiratory: Nonlabored respirations    Cardiovascular: Intact peripheral pulses    Musculoskeletal:     Right upper extremity: Mild swelling over the dorsal wrist.  No wounds.  Arthritic deformity in the hand.  Pain with passive range of motion.  Point tender over the radiocarpal joint line.  Neurovascular intact in the hand with a palpable radial pulse.  Nontender over the elbow or shoulder proximally.  Compartments are soft.    Left lower extremity: Well-healed left hip incisions from prior long cephalomedullary nail insertion.  5 cm x 5 cm area of hematoma along the lateral hip.  The overlying skin appears intact.  Scattered bruising is present.  No pain with passive hip motion.  Nontender of the knee, lower leg, ankle, foot.  Calf soft.  Distal neurovascular intact.     Imaging:  Imaging Results (Last 24 Hours)       Procedure Component Value Units Date/Time    XR Shoulder 2+ View Right [[id number]] Collected: 08/28/23 2249     Updated: 08/28/23 2253    Narrative:      PROCEDURE: XR SHOULDER 2+ VW RIGHT     COMPARISON: None.     INDICATIONS: 78-YEAR-OLD FEMALE WHO FELL; SHE C/O RIGHT SHOULDER PAIN.     FINDINGS:   Four views were obtained.  No acute fracture or acute malalignment is identified involving the   right shoulder girdle.  There is generalized osteopenia.  Mild-to-moderate degenerative changes   involve the right shoulder.  Moderate-to-severe dextroscoliosis involves the cervicothoracic spine.    Degenerative changes are seen throughout the imaged spine.  A single surgical clip is projected   over the right axillary region.  No right pneumothorax is seen.  External artifacts are noted.  If   symptoms or clinical concerns persist, consider imaging follow-up.       Impression:         No acute fracture or acute malalignment is identified.              Please note that portions of this note were completed with a voice recognition  program.     CARRIE WILLOUGHBY JR, MD         Electronically Signed and Approved By: CARRIE WILLOUGHBY JR, MD on 8/28/2023 at 22:49                        XR Wrist 3+ View Right [[id number]] Collected: 08/28/23 2127     Updated: 08/28/23 2130    Narrative:      PROCEDURE: XR WRIST 3+ VW RIGHT     COMPARISON: [facility], CR, XR HAND 3+ VW RIGHT, 8/28/2023, 20:35.     INDICATIONS: FELL COMPLAINS OF RIGHT WRIST AND HAND PAIN WITH BRUISING TO MEDIAL WRIST     FINDINGS:   Dorsal wrist soft tissue swelling is noted.  The posterior lunate fracture noted on the hand x-ray   previously is not as well demonstrated by this exam.  Osseous structures otherwise appear   unremarkable.  The bones appear diffusely osteopenic.       Impression:         1. Tiny fracture fragment off the posterior aspect of the lunate better noted on the hand   radiographs.  2. Dorsal wrist soft tissue swelling  3. Diffuse osteopenia               Garcia Lopez M.D.         Electronically Signed and Approved By: Garcia Lopez M.D. on 8/28/2023 at 21:26                     XR Hand 3+ View Right [[id number]] Collected: 08/28/23 2125     Updated: 08/28/23 2128    Narrative:      PROCEDURE: XR HAND 3+ VW RIGHT     COMPARISON: [facility], CR, XR WRIST 3+ VW RIGHT, 8/28/2023, 20:35.     INDICATIONS: FELL COMPLAINS OF RIGHT WRIST AND HAND PAIN WITH BRUISING TO MEDIAL WRIST     FINDINGS:   Dorsal wrist soft tissue swelling is noted.  The bones appear diffusely osteopenic.  There is a 0.3   cm fracture fragment off the posterior aspect of the lunate.  No other fracture is seen.  Alignment   is anatomic.       Impression:         1. 0.3 cm fracture fragment off the posterior aspect of the lunate   2. Dorsal wrist soft tissue swelling               Garcia Lopez M.D.         Electronically Signed and Approved By: Garcia Lopez M.D. on 8/28/2023 at 21:25                     CT Pelvis Without Contrast [[id number]] Collected: 08/28/23 1826      Updated: 08/28/23 1828    Narrative:      PROCEDURE: CT PELVIS WO CONTRAST     COMPARISON: None     INDICATIONS: Trauma, fall bilat hip pain     PROTOCOL:   Standard imaging protocol performed      RADIATION:   DLP: 630.6mGy*cm    Automated exposure control was utilized to minimize radiation dose.      TECHNIQUE: After obtaining the patient's consent, CT images were created without intravenous   contrast.  Multiplanar imaging was performed.     FINDINGS:   A dynamic hip screw has been placed on the left.  The distal aspect of the intramedullary james is   not imaged on this exam.  There appears to be a largely healed left femoral inter trochanteric   fracture.  There is left convexity mid lumbar scoliosis.  There is a vaguely seen transverse   fracture of the inferior sacrum bilaterally.  Alignment is near anatomic.  Osseous structures   otherwise appear unremarkable.  In the subcutaneous fat lateral to the left iliac wing is a 6.1 cm   fluid collection consistent with a hematoma.  Soft tissue contusion is noted more inferiorly.    Visualized pelvic viscera appear normal.       Impression:         1. Nondisplaced transverse fracture of the inferior sacrum bilaterally.  This is age indeterminate.    Correlate with any previous available pelvic CT results.  2. Previous left femoral inter trochanteric fracture which appears largely healed following open   reduction internal fixation  3. Soft tissue hematoma/contusion along the left lateral aspect of the pelvis, as above            Garcia Lopez M.D.         Electronically Signed and Approved By: Garcia Lopez M.D. on 8/28/2023 at 18:25                     CT Head Without Contrast [[id number]] Collected: 08/28/23 1805     Updated: 08/28/23 1809    Narrative:      PROCEDURE: CT HEAD WO CONTRAST     COMPARISON:  None  INDICATIONS: Trauma altered mental status, fall     PROTOCOL:   Standard imaging protocol performed      RADIATION:   DLP: 1082.2mGy*cm    MA and/or KV was  adjusted to minimize radiation dose.          TECHNIQUE: After obtaining the patient's consent, CT images were obtained without non-ionic   intravenous contrast material.      FINDINGS:   Mild low density in the cerebral white matter is consistent with small vessel ischemic disease.    The ventricles and cisterns are normal in size and configuration     The visualized extracranial soft tissues appear normal.  Small bilateral mastoid effusions are   noted.  No calvarial fracture is identified.       Impression:         1. Mild small vessel ischemic changes in the white matter  2. No calvarial fracture or intracranial hemorrhage  3. Small bilateral mastoid effusions            Garcia Lopez M.D.         Electronically Signed and Approved By: Garcia Lopez M.D. on 8/28/2023 at 18:05                     XR Chest 2 View [[id number]] Collected: 08/28/23 1444     Updated: 08/28/23 1447    Narrative:      PROCEDURE: XR CHEST 2 VW     COMPARISON: None     INDICATIONS: HYPOXIA     FINDINGS:   The heart is normal in size.     The lungs are well-expanded and free of infiltrates.     Moderate to marked thoracic and lumbar scoliosis is evident.     No fractures are visualized.  Mild degenerative spurring is evident.       Impression:         No active cardiopulmonary disease is seen.            PRATIK AGRAWAL MD         Electronically Signed and Approved By: PRATIK AGRAWAL MD on 8/28/2023 at 14:44                     XR Hip With or Without Pelvis 2 - 3 View Left [[id number]] Collected: 08/28/23 1140     Updated: 08/28/23 1143    Narrative:      PROCEDURE: XR HIP W OR WO PELVIS 2-3 VIEW LEFT     COMPARISON: None     INDICATIONS: LEFT HIP PAIN S/P FALL     FINDINGS:   The sacrum, pelvis, and proximal femurs appear intact.  Bony structures have an osteopenic   appearance.     Dynamic screw and medullary james device is in place on the left.  Healed fracture is seen in the   intertrochanteric region of the proximal left femur.        Impression:       Left hip series with AP pelvis demonstrating no acute bony abnormality.               PRATIK AGRAWAL MD         Electronically Signed and Approved By: PRATIK AGRAWAL MD on 8/28/2023 at 11:41                              Result Review    Result Review:  I have personally reviewed the results from the time of this admission to 8/29/2023 07:06 EDT and agree with these findings:  [x]  Laboratory  []  Microbiology  [x]  Radiology  []  EKG/Telemetry   []  Cardiology/Vascular   []  Pathology  []  Old records  []  Other:      Assessment & Plan   Assessment / Plan     Brief Patient Summary:  Ying Brock is a 78 y.o. female with a right dorsal lunate fracture, nondisplaced sacral fractures, and left hip hematoma status post fall.  Altered mental status.  Recently diagnosed with E. coli UTI.    Active Hospital Problems:  Active Hospital Problems    Diagnosis    • **AMS (altered mental status)      Plan:     Nonoperative management  Short arm cast placement right upper extremity  Weight-bear as tolerated bilateral lower extremities with a walker  May use platform walker with right upper extremity as able  Conservative management left hip hematoma  Pain control, DVT prophylaxis, further medical management per primary team  2-week orthopedic follow-up for reevaluation, please call with any concerns      Electronically signed by Jesus Becerra MD, 08/29/23, 7:06 AM EDT.

## 2023-08-29 NOTE — PROGRESS NOTES
[facility]   Hospitalist Progress Note  Date: 2023  Patient Name: Ying Brock  : 1945  MRN: [id number]  Date of admission: 2023      Subjective   Subjective     Chief Complaint: Confusion    Summary: 78 y.o. female past medical history of depression/anxiety, chronic pain, history of MDR E. coli urinary tract infections, hypertension, mild cognitive impairment, and CKD stage IIIa who presents from nursing facility due to fall. She was recently admitted to hospital in [place] and found to have an E. coli UTI that was MDR. He was discharged to rehab facility on ertapenem to treat UTI. CT scan of the pelvis without contrast shows nondisplaced transverse fracture of the inferior sacral bilaterally.  Also found to have scaphoid wrist fracture.  Patient's presentation was discussed with orthopedics. Patient to be admitted due to acute metabolic encephalopathy most likely from the fracture as well as polypharmacy.     Interval Followup: No acute events overnight.  Having significant pain around her hips and in her wrist.  Pain medication has helped significantly.    Objective   Objective     Vitals:   Temp:  [97.7 °F (36.5 °C)-98.8 °F (37.1 °C)] 98.4 °F (36.9 °C)  Heart Rate:  [54-75] 54  Resp:  [18] 18  BP: (124-157)/() 124/69  Physical Exam    Constitutional: Awake, alert, NAD   Respiratory: Clear to auscultation bilaterally, nonlabored respirations    Cardiovascular: RRR, no MRG   Gastrointestinal: Positive bowel sounds, soft, nontender, nondistended   Neurologic: Oriented x 3, global weakness noted, strength symmetric in all extremities, Cranial Nerves grossly intact to confrontation, speech clear             EXT: Right wrist with significant bruising noted, neurovascularly intact, normal range of motion    Result Review    Result Review:  I have personally reviewed the results below:  [x]  Laboratory personally reviewed CMP, CBC, magnesium  []  Microbiology  []  Radiology  [x]   EKG/Telemetry   []  Cardiology/Vascular   []  Pathology  []  Old records  []  Other:  CBC          8/28/2023    20:56 8/29/2023    05:33   CBC   WBC 6.78  5.69    RBC 3.73  3.55    Hemoglobin 12.1  11.0    Hematocrit 34.8  34.0    MCV 93.3  95.8    MCH 32.4  31.0    MCHC 34.8  32.4    RDW 13.1  12.9    Platelets 195  183      CMP          8/28/2023    17:34 8/29/2023    05:33   CMP   Glucose 101  125    BUN 28  29    Creatinine 1.10  0.99    EGFR 52.2  59.2    Sodium 137  137    Potassium 4.4  3.9    Chloride 101  104    Calcium 9.5  9.1    Total Protein 7.5  7.0    Albumin 4.4  4.0    Globulin 3.1  3.0    Total Bilirubin 1.2  1.8    Alkaline Phosphatase 171  148    AST (SGOT) 19  18    ALT (SGPT) 9  6    Albumin/Globulin Ratio 1.4  1.3    BUN/Creatinine Ratio 25.5  29.3    Anion Gap 9.1  11.7        Assessment & Plan   Assessment / Plan     Assessment/Plan:  Acute metabolic encephalopathy  Polypharmacy  Sacral fracture is nonoperative  Hypertension  History of MDR E. Coli UTI  CKD stage IIIa  Chronic pain with polypharmacy  Mild cognitive impairment of unknown etiology  History of breast cancer     Continue to monitor in the hospital for management of the above  Continue to hold lorazepam, Neurontin, and muscle relaxers  Norco as needed for pain  Orthopedic surgery consulted, appreciate assistance  Will need casting of the left wrist with repeat x-ray in 4 to 6 weeks  Sacral fracture nonoperative  Will need PT/OT/rehab placement  Shoulder x-ray personally reviewed without evidence of fracture  Continue appropriate home medications  Trend renal function and electrolytes with a.m. BMP, magnesium   Trend Hgb and WBC with a.m. CBC     Discussed plan with RN, orthopedic surgery    DVT prophylaxis:  Medical DVT prophylaxis orders are present.    CODE STATUS:   Level Of Support Discussed With: Patient  Code Status (Patient has no pulse and is not breathing): CPR (Attempt to Resuscitate)  Medical Interventions (Patient has  pulse or is breathing): Full Support        Electronically signed by Blanco Balderrama MD, 08/29/23, 2:19 PM EDT.

## 2023-08-30 PROCEDURE — 25010000002 ENOXAPARIN PER 10 MG: Performed by: INTERNAL MEDICINE

## 2023-08-30 PROCEDURE — 97161 PT EVAL LOW COMPLEX 20 MIN: CPT

## 2023-08-30 PROCEDURE — 97166 OT EVAL MOD COMPLEX 45 MIN: CPT

## 2023-08-30 RX ORDER — LORAZEPAM 0.5 MG/1
0.5 TABLET ORAL EVERY 8 HOURS PRN
Status: DISCONTINUED | OUTPATIENT
Start: 2023-08-30 | End: 2023-08-31 | Stop reason: HOSPADM

## 2023-08-30 RX ORDER — AMOXICILLIN 250 MG
1 CAPSULE ORAL 2 TIMES DAILY
Status: DISCONTINUED | OUTPATIENT
Start: 2023-08-30 | End: 2023-08-31 | Stop reason: HOSPADM

## 2023-08-30 RX ORDER — POLYETHYLENE GLYCOL 3350 17 G/17G
17 POWDER, FOR SOLUTION ORAL DAILY
Status: DISCONTINUED | OUTPATIENT
Start: 2023-08-30 | End: 2023-08-31 | Stop reason: HOSPADM

## 2023-08-30 RX ADMIN — FLUOXETINE 20 MG: 20 CAPSULE ORAL at 10:21

## 2023-08-30 RX ADMIN — CHOLESTYRAMINE 4 G: 4 POWDER, FOR SUSPENSION ORAL at 10:20

## 2023-08-30 RX ADMIN — METOPROLOL TARTRATE 12.5 MG: 25 TABLET, FILM COATED ORAL at 10:21

## 2023-08-30 RX ADMIN — Medication 10 ML: at 10:23

## 2023-08-30 RX ADMIN — SODIUM CHLORIDE 75 ML/HR: 9 INJECTION, SOLUTION INTRAVENOUS at 01:46

## 2023-08-30 RX ADMIN — Medication 10 ML: at 20:15

## 2023-08-30 RX ADMIN — SENNOSIDES AND DOCUSATE SODIUM 1 TABLET: 50; 8.6 TABLET ORAL at 20:13

## 2023-08-30 RX ADMIN — ENOXAPARIN SODIUM 40 MG: 100 INJECTION SUBCUTANEOUS at 20:13

## 2023-08-30 RX ADMIN — HYDROCODONE BITARTRATE AND ACETAMINOPHEN 1 TABLET: 5; 325 TABLET ORAL at 17:46

## 2023-08-30 RX ADMIN — METOPROLOL TARTRATE 12.5 MG: 25 TABLET, FILM COATED ORAL at 20:14

## 2023-08-30 RX ADMIN — LISINOPRIL 40 MG: 20 TABLET ORAL at 10:21

## 2023-08-30 RX ADMIN — HYDROCODONE BITARTRATE AND ACETAMINOPHEN 1 TABLET: 5; 325 TABLET ORAL at 10:20

## 2023-08-30 NOTE — THERAPY EVALUATION
Acute Care - Physical Therapy Initial Evaluation  TOYIN Chance     Patient Name: Ying Brock  : 1945  MRN: [id number]  Today's Date: 2023      Visit Dx: Admit date: 2023     Referring Physician: Blanco Ludwig MD     Surgery Date:* No surgery found *          ICD-10-CM ICD-9-CM   1. Altered mental status, unspecified altered mental status type  R41.82 780.97   2. Fall, initial encounter  W19.XXXA E888.9   3. Closed fracture of sacrum, unspecified portion of sacrum, initial encounter  S32.10XA 805.6   4. Decreased activities of daily living (ADL)  Z78.9 V49.89   5. Difficulty walking  R26.2 719.7     Patient Active Problem List   Diagnosis    Chronic pain syndrome    Fall    Fracture, hip, left, sequela    Hypertension, essential    VIT D DEF    [facility] ID[facility]A    Vitamin B12 deficiency (dietary) anemia    Acute cystitis without hematuria    Major depressive disorder, recurrent, moderate    Anxiety, generalized    Anemia due to vitamin B12 deficiency    AMS (altered mental status)     Past Medical History:   Diagnosis Date    Calculus of kidney     Constipation     Depression     Hypertension     Hypokalemia     Mild cognitive impairment of uncertain or unknown etiology     Muscle spasm of back     Muscle weakness     Unsteadiness on feet     Unsteady gait     UTI (urinary tract infection)     Vitamin D3 deficiency      History reviewed. No pertinent surgical history.  PT Assessment (last 12 hours)       PT Evaluation and Treatment       Row Name 23 1300          Physical Therapy Time and Intention    Subjective Information no complaints  -DP     Document Type evaluation  -DP     Mode of Treatment individual therapy;physical therapy  -DP     Patient Effort good  -DP       Row Name 23 1300          General Information    Patient Profile Reviewed yes  -DP     Patient Observations alert;cooperative;agree to therapy  -DP     General Observations of Patient Pt was rehab prior t this  hospitalization  -DP     Prior Level of Function independent:;gait;transfer;bed mobility;ADL's  -DP     Equipment Currently Used at Home walker, rolling  -DP     Existing Precautions/Restrictions fall;non-weight bearing  RUE: NWB, WBAT with RW: BLE  -DP     Barriers to Rehab none identified  -DP       Row Name 08/30/23 1300          Living Environment    Current Living Arrangements home  -DP     People in Home child(antionette), adult  -DP       Row Name 08/30/23 1300          Home Main Entrance    Number of Stairs, Main Entrance one  -DP       Row Name 08/30/23 1300          Home Use of Assistive/Adaptive Equipment    Equipment Currently Used at Home walker, rolling  -DP       Row Name 08/30/23 1300          Cognition    Orientation Status (Cognition) oriented x 3  -DP       Row Name 08/30/23 1300          Range of Motion (ROM)    Range of Motion bilateral lower extremities;ROM is WFL  -DP       Row Name 08/30/23 1300          Strength (Manual Muscle Testing)    Strength (Manual Muscle Testing) bilateral lower extremities  4-/5  -DP       Row Name 08/30/23 1300          Mobility    Extremity Weight-bearing Status right lower extremity;left lower extremity;right upper extremity  -DP     Right Upper Extremity (Weight-bearing Status) non weight-bearing (NWB)  -DP     Left Lower Extremity (Weight-bearing Status) weight-bearing as tolerated (WBAT)  -DP     Right Lower Extremity (Weight-bearing Status) weight-bearing as tolerated (WBAT)  -DP       Row Name 08/30/23 1300          Bed Mobility    Bed Mobility supine-sit-supine  -DP     Supine-Sit-Supine [place] (Bed Mobility) minimum assist (75% patient effort)  -DP     Assistive Device (Bed Mobility) bed rails;head of bed elevated  -DP       Row Name 08/30/23 1300          Transfers    Transfers sit-stand transfer  -DP       Row Name 08/30/23 1300          Sit-Stand Transfer    Sit-Stand [place] (Transfers) minimum assist (75% patient effort)  -DP       Row Name  08/30/23 1300          Gait/Stairs (Locomotion)    Gait/Stairs Locomotion gait/ambulation assistive device  -DP     [place] Level (Gait) minimum assist (75% patient effort)  -DP     Assistive Device (Gait) walker, front-wheeled  -DP     Distance in Feet (Gait) 30  -DP     Comment, (Gait/Stairs) Therapist assisted the Pt to maneuver the RW on right side  -DP       Row Name 08/30/23 1300          Balance    Balance Assessment standing dynamic balance  -DP     Dynamic Standing Balance contact guard  -DP     Position/Device Used, Standing Balance supported;walker, front-wheeled  -DP       Row Name             Wound 08/28/23 2300 Right lateral knee Skin Tear    Wound - Properties Group Placement Date: 08/28/23  -BW Placement Time: 2300  -BW Side: Right  -BW Orientation: lateral  -BW Location: knee  -BW Primary Wound Type: Skin tear  -BW Additional Comments: present on admission  -BW    Retired Wound - Properties Group Placement Date: 08/28/23  -BW Placement Time: 2300  -BW Side: Right  -BW Orientation: lateral  -BW Location: knee  -BW Primary Wound Type: Skin tear  -BW Additional Comments: present on admission  -BW    Retired Wound - Properties Group Date first assessed: 08/28/23  -BW Time first assessed: 2300  -BW Side: Right  -BW Location: knee  -BW Primary Wound Type: Skin tear  -BW Additional Comments: present on admission  -BW      Row Name             Wound 08/29/23 1201 Right anterior foot Traumatic    Wound - Properties Group Placement Date: 08/29/23  -HUSAM Placement Time: 1201  -HUSAM Present on Hospital Admission: Y  -HUSAM Side: Right  -HUSAM Orientation: anterior  -HUSAM Location: foot  -HUSAM Primary Wound Type: Traumatic  -HUSAM    Retired Wound - Properties Group Placement Date: 08/29/23  -HUSAM Placement Time: 1201  -HUSAM Present on Hospital Admission: Y  -HUSAM Side: Right  -HUSAM Orientation: anterior  -HUSAM Location: foot  -HUSAM Primary Wound Type: Traumatic  -HUSAM    Retired Wound - Properties Group Date first assessed: 08/29/23   -HUSAM Time first assessed: 1201  -HUSAM Present on Hospital Admission: Y  -HUSAM Side: Right  -HUSAM Location: foot  -HUSAM Primary Wound Type: Traumatic  -HUSAM      Row Name 08/30/23 1300          Plan of Care Review    Plan of Care Reviewed With patient  -DP     Outcome Evaluation Patient presents with decreased strength, transfers, and functional mobility.  She had a recent right wrist fracture and sacral fracture.  Patient will benefit from inpatient PT services and rehab upon discharge.  -DP       Row Name 08/30/23 1300          Therapy Assessment/Plan (PT)    Rehab Potential (PT) good, to achieve stated therapy goals  -DP     Criteria for Skilled Interventions Met (PT) yes;meets criteria  -DP     Therapy Frequency (PT) daily  -DP     Predicted Duration of Therapy Intervention (PT) 10 days  -DP     Problem List (PT) problems related to;mobility;strength  -DP     Activity Limitations Related to Problem List (PT) unable to transfer safely;unable to ambulate safely  -DP       Row Name 08/30/23 1300          PT Evaluation Complexity    History, PT Evaluation Complexity no personal factors and/or comorbidities  -DP     Examination of Body Systems (PT Eval Complexity) total of 4 or more elements  -DP     Clinical Presentation (PT Evaluation Complexity) stable  -DP     Clinical Decision Making (PT Evaluation Complexity) low complexity  -DP     Overall Complexity (PT Evaluation Complexity) low complexity  -DP       Row Name 08/30/23 1300          Physical Therapy Goals    Transfer Goal Selection (PT) transfer, PT goal 1  -DP     Gait Training Goal Selection (PT) gait training, PT goal 1  -DP       Row Name 08/30/23 1300          Transfer Goal 1 (PT)    Activity/Assistive Device (Transfer Goal 1, PT) sit-to-stand/stand-to-sit  -DP     [place] Level/Cues Needed (Transfer Goal 1, PT) standby assist  -DP     Time Frame (Transfer Goal 1, PT) 10 days  -DP       Row Name 08/30/23 1300          Gait Training Goal 1 (PT)     Activity/Assistive Device (Gait Training Goal 1, PT) assistive device use;walker, rolling;walker, rolling platform  -DP     [place] Level (Gait Training Goal 1, PT) standby assist  -DP     Distance (Gait Training Goal 1, PT) 200  -DP     Time Frame (Gait Training Goal 1, PT) 10 days  -DP               User Key  (r) = Recorded By, (t) = Taken By, (c) = Cosigned By      Initials Name Provider Type    Reena Ware, RN Registered Nurse    Mathew Taveras RN Registered Nurse    Yohan Villalpando, PT Physical Therapist                      PT Recommendation and Plan  Anticipated Discharge Disposition (PT): sub acute care setting  Planned Therapy Interventions (PT): gait training, bed mobility training, balance training, strengthening, transfer training  Therapy Frequency (PT): daily  Plan of Care Reviewed With: patient  Outcome Evaluation: Patient presents with decreased strength, transfers, and functional mobility.  She had a recent right wrist fracture and sacral fracture.  Patient will benefit from inpatient PT services and rehab upon discharge.   Outcome Measures       Row Name 08/30/23 1400             How much help from another person do you currently need...    Turning from your back to your side while in flat bed without using bedrails? 3  -DP      Moving from lying on back to sitting on the side of a flat bed without bedrails? 3  -DP      Moving to and from a bed to a chair (including a wheelchair)? 3  -DP      Standing up from a chair using your arms (e.g., wheelchair, bedside chair)? 3  -DP      Climbing 3-5 steps with a railing? 3  -DP      To walk in hospital room? 3  -DP      AM-PAC 6 Clicks Score (PT) 18  -DP         Functional Assessment    Outcome Measure Options AM-PAC 6 Clicks Basic Mobility (PT)  -DP                User Key  (r) = Recorded By, (t) = Taken By, (c) = Cosigned By      Initials Name Provider Type    Yohan Villalpando, PT Physical Therapist                     Time  Calculation:    PT Charges       Row Name 08/30/23 1403             Time Calculation    PT Received On 08/30/23  -DP      PT Goal Re-Cert Due Date 09/08/23  -DP         Untimed Charges    PT Eval/Re-eval Minutes 50  -DP         Total Minutes    Untimed Charges Total Minutes 50  -DP       Total Minutes 50  -DP                User Key  (r) = Recorded By, (t) = Taken By, (c) = Cosigned By      Initials Name Provider Type    Yohan Villalpando PT Physical Therapist                      PT G-Codes  Outcome Measure Options: AM-PAC 6 Clicks Basic Mobility (PT)  AM-PAC 6 Clicks Score (PT): 18  AM-PAC 6 Clicks Score (OT): 13    Yohan Coley PT  8/30/2023

## 2023-08-30 NOTE — THERAPY EVALUATION
Patient Name: Ying Brock  : 1945    MRN: [id number]                              Today's Date: 2023       Admit Date: 2023    Visit Dx:     ICD-10-CM ICD-9-CM   1. Altered mental status, unspecified altered mental status type  R41.82 780.97   2. Fall, initial encounter  W19.XXXA E888.9   3. Closed fracture of sacrum, unspecified portion of sacrum, initial encounter  S32.10XA 805.6   4. Decreased activities of daily living (ADL)  Z78.9 V49.89     Patient Active Problem List   Diagnosis    Chronic pain syndrome    Fall    Fracture, hip, left, sequela    Hypertension, essential    VIT D DEF    JOSE LUIS NEUR IDOPA    Vitamin B12 deficiency (dietary) anemia    Acute cystitis without hematuria    Major depressive disorder, recurrent, moderate    Anxiety, generalized    Anemia due to vitamin B12 deficiency    AMS (altered mental status)     Past Medical History:   Diagnosis Date    Calculus of kidney     Constipation     Depression     Hypertension     Hypokalemia     Mild cognitive impairment of uncertain or unknown etiology     Muscle spasm of back     Muscle weakness     Unsteadiness on feet     Unsteady gait     UTI (urinary tract infection)     Vitamin D3 deficiency      History reviewed. No pertinent surgical history.   General Information       Row Name 23 1008          OT Time and Intention    Document Type evaluation  -AV     Mode of Treatment individual therapy;occupational therapy  -AV       Row Name 23 1008          General Information    Patient Profile Reviewed yes  -AV     Prior Level of Function independent:;ADL's;all household mobility;transfer  sits to shower (walk-in). stands at sink to groom. elevated commode. ambulates with RW. no home O2.  -AV     Existing Precautions/Restrictions --  WBAT BLE. impaired skin integrity.  -AV     Barriers to Rehab none identified  -AV       Row Name 23 1008          Occupational Profile    Reason for Services/Referral (Occupational  Profile) Patient is a 78 [facility]. She is on 4th floor/ room air with right wrist casted and non-operative sacral fracture. OT consulted due to recent decline in ADL/transfer independence. No previous OT services for current condition.  -AV       Row Name 08/30/23 1008          Living Environment    People in Home --  daughter and family  -AV       Row Name 08/30/23 1008          Home Main Entrance    Number of Stairs, Main Entrance one  -AV       Row Name 08/30/23 1008          Stairs Within Home, Primary    Number of Stairs, Within Home, Primary none  -AV       Row Name 08/30/23 1008          Cognition    Orientation Status (Cognition) --  alert, pleasant and cooperative. able to retain information and follow commands.  -AV       Row Name 08/30/23 1008          Safety Issues, Functional Mobility    Impairments Affecting Function (Mobility) balance;strength;endurance/activity tolerance  -AV               User Key  (r) = Recorded By, (t) = Taken By, (c) = Cosigned By      Initials Name Provider Type    Manuel James OT Occupational Therapist                     Mobility/ADL's       Row Name 08/30/23 1014          Transfers    Comment, (Transfers) supine to long-sitting min assist. pleasantly declined further transfers.  -AV       Row Name 08/30/23 1014          Activities of Daily Living    BADL Assessment/Intervention --  Independent feeding with setup. Mod assist grooming in bed. Mod-max assist bathing and dressing. Dependent toilet hygiene (bedpan).  -AV               User Key  (r) = Recorded By, (t) = Taken By, (c) = Cosigned By      Initials Name Provider Type    Manuel James OT Occupational Therapist                   Obj/Interventions       Row Name 08/30/23 1015          Sensory Assessment (Somatosensory)    Sensory Assessment (Somatosensory) UE sensation intact  -AV       Row Name 08/30/23 1015          Vision  "Assessment/Intervention    Visual Impairment/Limitations WFL;corrective lenses for reading  -AV       Row Name 08/30/23 1015          Range of Motion Comprehensive    General Range of Motion bilateral upper extremity ROM WFL  -AV     Comment, General Range of Motion AROM (right short arm cast)  -AV       Row Name 08/30/23 1015          Strength Comprehensive (MMT)    Comment, General Manual Muscle Testing (MMT) Assessment 4(-)/5 left upper extremity and right anterior deltoid  -AV       Row Name 08/30/23 1015          Motor Skills    Motor Skills coordination;functional endurance  -AV     Coordination WFL  right dominant- WFL \"pretty much\"  -AV     Functional Endurance poor  -AV       Row Name 08/30/23 1015          Balance    Balance Assessment sitting static balance  -AV     Static Sitting Balance contact guard  -AV               User Key  (r) = Recorded By, (t) = Taken By, (c) = Cosigned By      Initials Name Provider Type    Manuel James, KRISTIE Occupational Therapist                   Goals/Plan       Row Name 08/30/23 1020          Transfer Goal 1 (OT)    Activity/Assistive Device (Transfer Goal 1, OT) transfers, all;walker, rolling platform  -AV     [place] Level/Cues Needed (Transfer Goal 1, OT) modified independence  -AV     Time Frame (Transfer Goal 1, OT) long term goal (LTG);10 days  -AV       Row Name 08/30/23 1020          Bathing Goal 1 (OT)    Activity/Device (Bathing Goal 1, OT) bathing skills, all;shower chair  -AV     [place] Level/Cues Needed (Bathing Goal 1, OT) modified independence  -AV     Time Frame (Bathing Goal 1, OT) long term goal (LTG);10 days  -AV       Row Name 08/30/23 1020          Dressing Goal 1 (OT)    Activity/Device (Dressing Goal 1, OT) dressing skills, all  -AV     [place]/Cues Needed (Dressing Goal 1, OT) modified independence  -AV     Time Frame (Dressing Goal 1, OT) long term goal (LTG);10 days  -AV       Row Name 08/30/23 1020          Toileting Goal 1 " (OT)    Activity/Device (Toileting Goal 1, OT) toileting skills, all;raised toilet seat  -AV     [place] Level/Cues Needed (Toileting Goal 1, OT) modified independence  -AV     Time Frame (Toileting Goal 1, OT) long term goal (LTG);10 days  -AV       Row Name 08/30/23 1020          Grooming Goal 1 (OT)    Activity/Device (Grooming Goal 1, OT) grooming skills, all  -AV     [place] (Grooming Goal 1, OT) modified independence  standing at sink  -AV     Time Frame (Grooming Goal 1, OT) long term goal (LTG);10 days  -AV       Row Name 08/30/23 1020          Strength Goal 1 (OT)    Strength Goal 1 (OT) Patient will demonstrate 4/5 left upper extremity to increase ADL/transfer independence.  -AV     Time Frame (Strength Goal 1, OT) long term goal (LTG);10 days  -AV       Row Name 08/30/23 1020          Problem Specific Goal 1 (OT)    Problem Specific Goal 1 (OT) Patient will demonstrate fair plus endurance/ activity tolerance needed to support ADLs.  -AV     Time Frame (Problem Specific Goal 1, OT) long term goal (LTG);10 days  -AV       Row Name 08/30/23 1020          Therapy Assessment/Plan (OT)    Planned Therapy Interventions (OT) activity tolerance training;BADL retraining;functional balance retraining;occupation/activity based interventions;patient/caregiver education/training;strengthening exercise;transfer/mobility retraining  -AV               User Key  (r) = Recorded By, (t) = Taken By, (c) = Cosigned By      Initials Name Provider Type    AV Manuel García, OT Occupational Therapist                   Clinical Impression       Row Name 08/30/23 1017          Pain Assessment    Additional Documentation Pain Scale: FACES Pre/Post-Treatment (Group)  -AV       Row Name 08/30/23 1017          Pain Scale: FACES Pre/Post-Treatment    Pain: FACES Scale, Pretreatment 4-->hurts little more  -AV     Posttreatment Pain Rating 4-->hurts little more  -AV       Row Name 08/30/23 1017          Plan of Care Review     Plan of Care Reviewed With patient  -AV     Progress no change  first session: evaluation  -AV     Outcome Evaluation Patient presents with limitations of balance, strength and endurance/activity tolerance which are impacting ADL/transfer independence. Skilled OT is indicated to remediate/compensate for deficits to maximize independence and safety with functional tasks.  -AV       Row Name 08/30/23 1017          Therapy Assessment/Plan (OT)    Patient/Family Therapy Goal Statement (OT) none stated  -AV     Rehab Potential (OT) good, to achieve stated therapy goals  -AV     Criteria for Skilled Therapeutic Interventions Met (OT) yes;meets criteria;skilled treatment is necessary  -AV     Therapy Frequency (OT) 5 times/wk  -AV       Row Name 08/30/23 1017          Therapy Plan Review/Discharge Plan (OT)    Anticipated Discharge Disposition (OT) sub acute care setting  -AV       Row Name 08/30/23 1017          Vital Signs    O2 Delivery Pre Treatment room air  -AV     O2 Delivery Intra Treatment room air  -AV     O2 Delivery Post Treatment room air  -AV               User Key  (r) = Recorded By, (t) = Taken By, (c) = Cosigned By      Initials Name Provider Type    AV Manuel García, OT Occupational Therapist                   Outcome Measures       Row Name 08/30/23 1022          How much help from another is currently needed...    Putting on and taking off regular lower body clothing? 2  -AV     Bathing (including washing, rinsing, and drying) 2  -AV     Toileting (which includes using toilet bed pan or urinal) 1  -AV     Putting on and taking off regular upper body clothing 2  -AV     Taking care of personal grooming (such as brushing teeth) 2  -AV     Eating meals 4  -AV     AM-PAC 6 Clicks Score (OT) 13  -AV       Row Name 08/30/23 0000          How much help from another person do you currently need...    Turning from your back to your side while in flat bed without using bedrails? 2  -SC     Moving from lying on  back to sitting on the side of a flat bed without bedrails? 2  -SC     Moving to and from a bed to a chair (including a wheelchair)? 1  -SC     Standing up from a chair using your arms (e.g., wheelchair, bedside chair)? 1  -SC     Climbing 3-5 steps with a railing? 1  -SC     To walk in hospital room? 1  -SC     AM-PAC 6 Clicks Score (PT) 8  -SC     Highest level of mobility 3 --> Sat at edge of bed  -SC       Row Name 08/30/23 1022          Functional Assessment    Outcome Measure Options AM-PAC 6 Clicks Daily Activity (OT);Optimal Instrument  -AV       Row Name 08/30/23 1022          Optimal Instrument    Optimal Instrument Optimal - 3  -AV     Bending/Stooping 3  -AV     Standing 5  -AV     Reaching 1  -AV     From the list, choose the 3 activities you would most like to be able to do without any difficulty Bending/stooping;Standing;Reaching  -AV     Total Score Optimal - 3 9  -AV               User Key  (r) = Recorded By, (t) = Taken By, (c) = Cosigned By      Initials Name Provider Type    Manuel James OT Occupational Therapist    Hyun Waggoner, RN Registered Nurse                    Occupational Therapy Education       Title: PT OT SLP Therapies (Done)       Topic: Occupational Therapy (Done)       Point: ADL training (Done)       Description:   Instruct learner(s) on proper safety adaptation and remediation techniques during self care or transfers.   Instruct in proper use of assistive devices.                  Learning Progress Summary             Patient Acceptance, E, VU by AV at 8/30/2023 1023                         Point: Home exercise program (Done)       Description:   Instruct learner(s) on appropriate technique for monitoring, assisting and/or progressing therapeutic exercises/activities.                  Learning Progress Summary             Patient Acceptance, E, VU by AV at 8/30/2023 1023                         Point: Precautions (Done)       Description:   Instruct learner(s) on  prescribed precautions during self-care and functional transfers.                  Learning Progress Summary             Patient Acceptance, E, VU by AV at 8/30/2023 1023                         Point: Body mechanics (Done)       Description:   Instruct learner(s) on proper positioning and spine alignment during self-care, functional mobility activities and/or exercises.                  Learning Progress Summary             Patient Acceptance, E, VU by AV at 8/30/2023 1023                                         User Key       Initials Effective Dates Name Provider Type Discipline     06/16/21 -  Manuel García, KRISTIE Occupational Therapist OT                  OT Recommendation and Plan  Planned Therapy Interventions (OT): activity tolerance training, BADL retraining, functional balance retraining, occupation/activity based interventions, patient/caregiver education/training, strengthening exercise, transfer/mobility retraining  Therapy Frequency (OT): 5 times/wk  Plan of Care Review  Plan of Care Reviewed With: patient  Progress: no change (first session: evaluation)  Outcome Evaluation: Patient presents with limitations of balance, strength and endurance/activity tolerance which are impacting ADL/transfer independence. Skilled OT is indicated to remediate/compensate for deficits to maximize independence and safety with functional tasks.     Time Calculation:   Evaluation Complexity (OT)  Review Occupational Profile/Medical/Therapy History Complexity: expanded/moderate complexity  Assessment, Occupational Performance/Identification of Deficit Complexity: 3-5 performance deficits  Clinical Decision Making Complexity (OT): detailed assessment/moderate complexity  Overall Complexity of Evaluation (OT): moderate complexity     Time Calculation- OT       Row Name 08/30/23 1025             Time Calculation- OT    OT Received On 08/30/23  -      OT Goal Re-Cert Due Date 09/08/23  -AV         Untimed Charges    OT  Eval/Re-eval Minutes 35  -AV         Total Minutes    Untimed Charges Total Minutes 35  -AV       Total Minutes 35  -AV                User Key  (r) = Recorded By, (t) = Taken By, (c) = Cosigned By      Initials Name Provider Type    Manuel James OT Occupational Therapist                  Therapy Charges for Today       Code Description Service Date Service Provider Modifiers Qty    [id number] HC OT EVAL MOD COMPLEXITY 3 8/30/2023 Manuel García OT GO 1                 Manuel García OT  8/30/2023

## 2023-08-30 NOTE — PLAN OF CARE
Goal Outcome Evaluation:  Plan of Care Reviewed With: patient            VSS during shift. Patient had some complaints of pain, treated per MAR. Patient ambulated with assistance and walker well. Patient sat in chair most of the afternoon and tolerated well. Dressing change per orders. No other complaints at this time. Continue plan of care.

## 2023-08-30 NOTE — PROGRESS NOTES
[facility]   Hospitalist Progress Note  Date: 2023  Patient Name: Ying Brock  : 1945  MRN: [id number]  Date of admission: 2023      Subjective   Subjective     Chief Complaint: Confusion    Summary: 78 y.o. female past medical history of depression/anxiety, chronic pain, history of MDR E. coli urinary tract infections, hypertension, mild cognitive impairment, and CKD stage IIIa who presents from nursing facility due to fall. She was recently admitted to hospital in [place] and found to have an E. coli UTI that was MDR. He was discharged to rehab facility on ertapenem to treat UTI. CT scan of the pelvis without contrast shows nondisplaced transverse fracture of the inferior sacral bilaterally.  Also found to have scaphoid wrist fracture.  Patient's presentation was discussed with orthopedics. Patient to be admitted due to acute metabolic encephalopathy most likely from the fracture as well as polypharmacy.     Interval Followup: No acute events overnight.  Still having some pain in her left hip and her sacrum.  Working with physical therapy on arrival.  Had wrist cast placed yesterday by orthopedic surgery.    Objective   Objective     Vitals:   Temp:  [97.5 °F (36.4 °C)-99 °F (37.2 °C)] 97.5 °F (36.4 °C)  Heart Rate:  [59-85] 66  Resp:  [18] 18  BP: (123-151)/(57-76) 151/61  Physical Exam    Constitutional: Awake, alert, NAD   Respiratory: Clear to auscultation bilaterally, nonlabored respirations    Cardiovascular: RRR, no MRG   Gastrointestinal: Positive bowel sounds, soft, nontender, nondistended   Neurologic: Oriented x 3, strength symmetric in all extremities, Cranial Nerves grossly intact to confrontation, speech clear             EXT: Right wrist with cast in place    Result Review    Result Review:  I have personally reviewed the results below:  [x]  Laboratory personally reviewed CMP, CBC, magnesium  []  Microbiology  []  Radiology  [x]  EKG/Telemetry   []  Cardiology/Vascular    []  Pathology  []  Old records  []  Other:  CBC          8/28/2023    20:56 8/29/2023    05:33   CBC   WBC 6.78  5.69    RBC 3.73  3.55    Hemoglobin 12.1  11.0    Hematocrit 34.8  34.0    MCV 93.3  95.8    MCH 32.4  31.0    MCHC 34.8  32.4    RDW 13.1  12.9    Platelets 195  183      CMP          8/28/2023    17:34 8/29/2023    05:33   CMP   Glucose 101  125    BUN 28  29    Creatinine 1.10  0.99    EGFR 52.2  59.2    Sodium 137  137    Potassium 4.4  3.9    Chloride 101  104    Calcium 9.5  9.1    Total Protein 7.5  7.0    Albumin 4.4  4.0    Globulin 3.1  3.0    Total Bilirubin 1.2  1.8    Alkaline Phosphatase 171  148    AST (SGOT) 19  18    ALT (SGPT) 9  6    Albumin/Globulin Ratio 1.4  1.3    BUN/Creatinine Ratio 25.5  29.3    Anion Gap 9.1  11.7      Assessment & Plan   Assessment / Plan     Assessment/Plan:  Acute metabolic encephalopathy  Polypharmacy  Sacral fracture is nonoperative  Hypertension  History of MDR E. Coli UTI  CKD stage IIIa  Chronic pain with polypharmacy  Mild cognitive impairment of unknown etiology  History of breast cancer     Continue to monitor in the hospital for management of the above  Orthopedic surgery following, appreciate assistance  Restart home lorazepam, continue to hold Neurontin and muscle relaxers  Norco as needed for pain, encouraged patient to use more frequently if needed  Status post casting of left wrist, will need repeat x-ray in 4 to 6 weeks on outpatient basis  Sacral fracture nonoperative  PT/OT consulted-social work sending precertification for rehab placement  Continue appropriate home medications.  Will discontinue Neurontin and muscle relaxers at discharge  Trend renal function and electrolytes with a.m. BMP, magnesium   Trend Hgb and WBC with a.m. CBC     Discussed plan with RN, orthopedic surgery    DVT prophylaxis:  Medical DVT prophylaxis orders are present.    CODE STATUS:   Level Of Support Discussed With: Patient  Code Status (Patient has no pulse  and is not breathing): CPR (Attempt to Resuscitate)  Medical Interventions (Patient has pulse or is breathing): Full Support

## 2023-08-30 NOTE — PLAN OF CARE
Goal Outcome Evaluation:  Plan of Care Reviewed With: patient        Progress: no change (first session: evaluation)  Outcome Evaluation: Patient presents with limitations of balance, strength and endurance/activity tolerance which are impacting ADL/transfer independence. Skilled OT is indicated to remediate/compensate for deficits to maximize independence and safety with functional tasks.      Anticipated Discharge Disposition (OT): sub acute care setting

## 2023-08-30 NOTE — PLAN OF CARE
Goal Outcome Evaluation:  Plan of Care Reviewed With: patient           Outcome Evaluation: Patient presents with decreased strength, transfers, and functional mobility.  She had a recent right wrist fracture and sacral fracture.  Patient will benefit from inpatient PT services and rehab upon discharge.      Anticipated Discharge Disposition (PT): sub acute care setting

## 2023-08-31 VITALS
DIASTOLIC BLOOD PRESSURE: 50 MMHG | SYSTOLIC BLOOD PRESSURE: 105 MMHG | BODY MASS INDEX: 19.49 KG/M2 | RESPIRATION RATE: 18 BRPM | HEIGHT: 66 IN | HEART RATE: 61 BPM | WEIGHT: 121.25 LBS | TEMPERATURE: 98.1 F | OXYGEN SATURATION: 97 %

## 2023-08-31 LAB
QT INTERVAL: 464 MS
QTC INTERVAL: 444 MS

## 2023-08-31 RX ORDER — MAG HYDROX/ALUMINUM HYD/SIMETH 400-400-40
1 SUSPENSION, ORAL (FINAL DOSE FORM) ORAL ONCE
Status: DISCONTINUED | OUTPATIENT
Start: 2023-08-31 | End: 2023-08-31 | Stop reason: HOSPADM

## 2023-08-31 RX ADMIN — LISINOPRIL 40 MG: 20 TABLET ORAL at 09:26

## 2023-08-31 RX ADMIN — HYDROCODONE BITARTRATE AND ACETAMINOPHEN 1 TABLET: 5; 325 TABLET ORAL at 04:21

## 2023-08-31 RX ADMIN — SODIUM CHLORIDE 1000 ML: 9 INJECTION, SOLUTION INTRAVENOUS at 04:21

## 2023-08-31 RX ADMIN — Medication 10 ML: at 09:27

## 2023-08-31 RX ADMIN — METOPROLOL TARTRATE 12.5 MG: 25 TABLET, FILM COATED ORAL at 09:26

## 2023-08-31 RX ADMIN — HYDROCODONE BITARTRATE AND ACETAMINOPHEN 1 TABLET: 5; 325 TABLET ORAL at 14:55

## 2023-08-31 RX ADMIN — FLUOXETINE 20 MG: 20 CAPSULE ORAL at 09:26

## 2023-08-31 NOTE — DISCHARGE SUMMARY
[facility]         HOSPITALIST  DISCHARGE SUMMARY    Patient Name: Ying Brock  : 1945  MRN: [id number]    Date of Admission: 2023  Date of Discharge: 2023  Primary Care Physician: Brice Noble    Consults       Date and Time Order Name Status Description    2023  8:36 PM Hospitalist (on-call MD unless specified)      2023  8:28 PM Orthopedics (on-call MD unless specified) Completed             Active and Resolved Hospital Problems:  Active Hospital Problems    Diagnosis POA    **AMS (altered mental status) [R41.82] Yes      Resolved Hospital Problems   No resolved problems to display.   Acute metabolic encephalopathy  Acute toxic encephalopathy secondary to polypharmacy  Sacral fracture is nonoperative  Hypertension  History of MDR E. Coli UTI  CKD stage IIIa  Chronic pain with polypharmacy  Mild cognitive impairment of unknown etiology  History of breast cancer    Hospital Course     Hospital Course:  Ying Brock is a 78 y.o. female past medical history of depression/anxiety, chronic pain, history of MDR E. coli urinary tract infections, hypertension, mild cognitive impairment, and CKD stage IIIa who presents from nursing facility due to fall. She was recently admitted to hospital in [place] and found to have an E. coli UTI that was MDR. He was discharged to rehab facility on ertapenem to treat UTI. CT scan of the pelvis without contrast shows nondisplaced transverse fracture of the inferior sacral bilaterally. Also found to have scaphoid wrist fracture. Patient's presentation was discussed with orthopedics. Patient to be admitted due to acute metabolic encephalopathy most likely from the fracture as well as polypharmacy.  She was admitted for further care, her right wrist was placed in a hard cast.  Will need repeat wrist x-ray in 4 to 6 weeks.  Pelvic fracture deemed nonoperative.  Worked with PT/OT who recommended rehab.  Gabapentin was held with  significant improvement of her mental status.  She is back to her baseline mental status.  She was discharged in stable condition to [place] for continued rehab on 8/31/2023.  Recommend follow-up PCP within 1 week, orthopedic surgery within 3 weeks.  Recommend repeat right wrist x-ray in 4 to 6 weeks.    DISCHARGE Follow Up Recommendations for labs and diagnostics: Right wrist x-ray in 4 to 6 weeks    Day of Discharge     Vital Signs:  Temp:  [97.2 °F (36.2 °C)-98.1 °F (36.7 °C)] 98.1 °F (36.7 °C)  Heart Rate:  [52-67] 61  Resp:  [18] 18  BP: (105-155)/(50-69) 105/50  Physical Exam:   Gen: NAD, WDWN  ENT: PERRL, EOMI   CV: RRR no MRG  Pulm: CTAB, no w/r/r  GI: Abd soft, NTND, +bs  Neuro: Right wrist in cast, moving all extremities spontaneously, CN II-XII grossly intact   Psych: A&O*3, normal mood and affect  Skin: No lesions or rashes noted    Discharge Details        Discharge Medications        Continue These Medications        Instructions Start Date   acetaminophen 325 MG tablet  Commonly known as: TYLENOL   650 mg, Oral, Every 6 Hours PRN      cholestyramine light 4 g packet   4 g, Oral, Daily      cyanocobalamin 1000 MCG/ML injection   1,000 mcg, Intramuscular, Every 30 Days      FLUoxetine 20 MG capsule  Commonly known as: PROzac   20 mg, Oral, Daily      HYDROcodone-acetaminophen 5-325 MG per tablet  Commonly known as: NORCO   1 tablet, Oral, Every 4 Hours PRN      lisinopril 40 MG tablet  Commonly known as: PRINIVIL,ZESTRIL   40 mg, Oral, Daily      LORazepam 0.5 MG tablet  Commonly known as: Ativan   0.5 mg, Oral, Every 8 Hours PRN      magnesium hydroxide 400 MG/5ML suspension  Commonly known as: MILK OF MAGNESIA   30 mL, Oral, Daily PRN      metoprolol tartrate 25 MG tablet  Commonly known as: LOPRESSOR   12.5 mg, Oral, 2 Times Daily      vitamin D3 125 MCG (5000 UT) tablet tablet   5,000 Units, Oral, Daily             Stop These Medications      gabapentin 100 MG capsule  Commonly known as:  NEURONTIN              No Known Allergies    Discharge Disposition:  Rehab Facility or Unit (DC - External)    Diet:  Hospital:  Diet Order   Procedures    Diet: Cardiac Diets; Healthy Heart (2-3 Na+); Texture: Regular Texture (IDDSI 7); Fluid Consistency: Thin (IDDSI 0)       Discharge Activity:   Activity Instructions       Activity as Tolerated              CODE STATUS:  Code Status and Medical Interventions:   Ordered at: 08/29/23 0836     Level Of Support Discussed With:    Patient     Code Status (Patient has no pulse and is not breathing):    CPR (Attempt to Resuscitate)     Medical Interventions (Patient has pulse or is breathing):    Full Support         Future Appointments   Date Time Provider Department Center   11/30/2023  1:00 PM Kiya Harmon MD [facility]       Additional Instructions for the Follow-ups that You Need to Schedule       Discharge Follow-up with PCP   As directed       Currently Documented PCP:    Brice Noble    PCP Phone Number:    [phone]     Follow Up Details: 3-5 days        Discharge Follow-up with Specified Provider: Orthopedic surgery; 3 Weeks   As directed      To: Orthopedic surgery   Follow Up: 3 Weeks                Pertinent  and/or Most Recent Results     PROCEDURES:   None    LAB RESULTS:      Lab 08/29/23  0533 08/28/23 2056 08/28/23  1707   WBC 5.69 6.78  --    HEMOGLOBIN 11.0* 12.1  --    HEMATOCRIT 34.0 34.8  --    PLATELETS 183 195  --    NEUTROS ABS 3.80 4.20  --    IMMATURE GRANS (ABS) 0.02 0.01  --    LYMPHS ABS 1.28 1.69  --    MONOS ABS 0.47 0.51  --    EOS ABS 0.08 0.31  --    MCV 95.8 93.3  --    LACTATE  --   --  0.9   PROTIME  --   --  13.6   APTT  --   --  28.5*         Lab 08/29/23  0533 08/28/23  1734   SODIUM 137 137   POTASSIUM 3.9 4.4   CHLORIDE 104 101   CO2 21.3* 26.9   ANION GAP 11.7 9.1   BUN 29* 28*   CREATININE 0.99 1.10*   EGFR 59.2* 52.2*   GLUCOSE 125* 101*   CALCIUM 9.1 9.5   MAGNESIUM 2.2  --          Lab 08/29/23  0533  08/28/23  1734   TOTAL PROTEIN 7.0 7.5   ALBUMIN 4.0 4.4   GLOBULIN 3.0 3.1   ALT (SGPT) 6 9   AST (SGOT) 18 19   BILIRUBIN 1.8* 1.2   ALK PHOS 148* 171*   LIPASE  --  27         Lab 08/28/23 2016 08/28/23  1734 08/28/23  1707   PROBNP  --  455.1  --    HSTROP T 18* 16*  --    PROTIME  --   --  13.6   INR  --   --  1.03                 Brief Urine Lab Results  (Last result in the past 365 days)        Color   Clarity   Blood   Leuk Est   Nitrite   Protein   CREAT   Urine HCG        08/28/23 1059 Yellow   Clear   Negative   Moderate (2+)   Negative   Negative                 Microbiology Results (last 10 days)       ** No results found for the last 240 hours. **            XR Chest 2 View    Result Date: 8/28/2023    No active cardiopulmonary disease is seen.     PRATIK AGRAWAL MD       Electronically Signed and Approved By: PRATIK AGRAWAL MD on 8/28/2023 at 14:44             XR Shoulder 2+ View Right    Result Date: 8/28/2023    No acute fracture or acute malalignment is identified.     Please note that portions of this note were completed with a voice recognition program.  CARRIE WILLOUGHBY JR, MD       Electronically Signed and Approved By: CARRIE WILLOUGHBY JR, MD on 8/28/2023 at 22:49              XR Wrist 3+ View Right    Result Date: 8/28/2023    1. Tiny fracture fragment off the posterior aspect of the lunate better noted on the hand radiographs. 2. Dorsal wrist soft tissue swelling 3. Diffuse osteopenia      Garcia Lopez M.D.       Electronically Signed and Approved By: Garcia Lopez M.D. on 8/28/2023 at 21:26             XR Hand 3+ View Right    Result Date: 8/28/2023    1. 0.3 cm fracture fragment off the posterior aspect of the lunate 2. Dorsal wrist soft tissue swelling      Garcia Lopez M.D.       Electronically Signed and Approved By: Garcia Lopez M.D. on 8/28/2023 at 21:25             CT Head Without Contrast    Result Date: 8/28/2023    1. Mild small vessel ischemic changes in the white matter 2. No  calvarial fracture or intracranial hemorrhage 3. Small bilateral mastoid effusions     Garcia Lopez M.D.       Electronically Signed and Approved By: Garcia Lopez M.D. on 8/28/2023 at 18:05             CT Pelvis Without Contrast    Result Date: 8/28/2023    1. Nondisplaced transverse fracture of the inferior sacrum bilaterally.  This is age indeterminate.  Correlate with any previous available pelvic CT results. 2. Previous left femoral inter trochanteric fracture which appears largely healed following open reduction internal fixation 3. Soft tissue hematoma/contusion along the left lateral aspect of the pelvis, as above     Garcia Lopez M.D.       Electronically Signed and Approved By: Garcia Lopez M.D. on 8/28/2023 at 18:25             XR Hip With or Without Pelvis 2 - 3 View Left    Result Date: 8/28/2023   Left hip series with AP pelvis demonstrating no acute bony abnormality.      PRATIK AGRAWAL MD       Electronically Signed and Approved By: PRATIK AGRAWAL MD on 8/28/2023 at 11:41                           Labs Pending at Discharge:        Time spent on Discharge including face to face service: 33 minutes    Electronically signed by Blanco Balderrama MD, 08/31/23, 12:29 PM EDT.

## 2023-09-18 ENCOUNTER — OFFICE VISIT (OUTPATIENT)
Dept: ORTHOPEDIC SURGERY | Facility: CLINIC | Age: 78
End: 2023-09-18
Payer: MEDICARE

## 2023-09-18 VITALS — HEART RATE: 78 BPM | BODY MASS INDEX: 19.44 KG/M2 | OXYGEN SATURATION: 99 % | WEIGHT: 121 LBS | HEIGHT: 66 IN

## 2023-09-18 DIAGNOSIS — M25.531 RIGHT WRIST PAIN: Primary | ICD-10-CM

## 2023-09-18 DIAGNOSIS — G89.4 CHRONIC PAIN SYNDROME: ICD-10-CM

## 2023-09-18 DIAGNOSIS — S62.101A CLOSED FRACTURE OF RIGHT WRIST, INITIAL ENCOUNTER: ICD-10-CM

## 2023-09-18 RX ORDER — HYDROCODONE BITARTRATE AND ACETAMINOPHEN 5; 325 MG/1; MG/1
1 TABLET ORAL EVERY 8 HOURS PRN
Qty: 20 TABLET | Refills: 0 | Status: SHIPPED | OUTPATIENT
Start: 2023-09-18

## 2023-09-18 NOTE — PROGRESS NOTES
"Chief Complaint  Pain and Initial Evaluation of the Right Wrist    Subjective          Ying Brock presents to [facility] for   History of Present Illness    The patient presents here today for evaluation of the right wrist. The patient was seen at the hospital and had a short arm cast placed for a fracture. She has no other complaints.     No Known Allergies     Social History     Socioeconomic History    Marital status:    Tobacco Use    Smoking status: Never    Smokeless tobacco: Never   Vaping Use    Vaping Use: Never used   Substance and Sexual Activity    Drug use: Never    Sexual activity: Never        I reviewed the patient's chief complaint, history of present illness, review of systems, past medical history, surgical history, family history, social history, medications, and allergy list.     REVIEW OF SYSTEMS    Constitutional: Denies fevers, chills, weight loss  Cardiovascular: Denies chest pain, shortness of breath  Skin: Denies rashes, acute skin changes  Neurologic: Denies headache, loss of consciousness  MSK: Right wrist pain      Objective   Vital Signs:   Pulse 78   Ht 167.6 cm (66\")   Wt 54.9 kg (121 lb)   SpO2 99%   BMI 19.53 kg/m²     Body mass index is 19.53 kg/m².    Physical Exam    General: Alert. No acute distress.   Right wrist- short arm cast intact, clean dry and well fitting. Neurovascularly intact. Sensation to light touch median, radial, ulnar nerve. Positive AIN, PIN, ulnar nerve motor function. Positive pulses.     Procedures    Imaging Results (Most Recent)       Procedure Component Value Units Date/Time    XR Wrist 2 View Right [[id number]] Resulted: 09/18/23 1429     Updated: 09/18/23 1430    Narrative:      Indications: Follow-up right dorsal lunate fracture    Views: AP and lateral right wrist    Findings: Dorsal lunate fracture difficult to visualize given the   overlying cast material.  All joints appear well aligned.  Scattered "   degenerative changes are stable.    Comparative Data: Comparative data found and reviewed today                     Assessment and Plan        XR Chest 2 View    Result Date: 8/28/2023  Narrative: PROCEDURE: XR CHEST 2 VW  COMPARISON: None  INDICATIONS: HYPOXIA  FINDINGS:  The heart is normal in size.  The lungs are well-expanded and free of infiltrates.  Moderate to marked thoracic and lumbar scoliosis is evident.  No fractures are visualized.  Mild degenerative spurring is evident.      Impression:   No active cardiopulmonary disease is seen.     PRATIK AGRAWAL MD       Electronically Signed and Approved By: PRATIK AGRAWAL MD on 8/28/2023 at 14:44             XR Shoulder 2+ View Right    Result Date: 8/28/2023  Narrative: PROCEDURE: XR SHOULDER 2+ VW RIGHT  COMPARISON: None.  INDICATIONS: 78-YEAR-OLD FEMALE WHO FELL; SHE C/O RIGHT SHOULDER PAIN.  FINDINGS:  Four views were obtained.  No acute fracture or acute malalignment is identified involving the right shoulder girdle.  There is generalized osteopenia.  Mild-to-moderate degenerative changes involve the right shoulder.  Moderate-to-severe dextroscoliosis involves the cervicothoracic spine.  Degenerative changes are seen throughout the imaged spine.  A single surgical clip is projected over the right axillary region.  No right pneumothorax is seen.  External artifacts are noted.  If symptoms or clinical concerns persist, consider imaging follow-up.      Impression:   No acute fracture or acute malalignment is identified.     Please note that portions of this note were completed with a voice recognition program.  CARRIE WILLOUGHBY JR, MD       Electronically Signed and Approved By: CARRIE WILLOUGHBY JR, MD on 8/28/2023 at 22:49              XR Wrist 2 View Right    Result Date: 9/18/2023  Narrative: Indications: Follow-up right dorsal lunate fracture Views: AP and lateral right wrist Findings: Dorsal lunate fracture difficult to visualize given the overlying cast  material.  All joints appear well aligned.  Scattered degenerative changes are stable. Comparative Data: Comparative data found and reviewed today    XR Wrist 3+ View Right    Result Date: 8/28/2023  Narrative: PROCEDURE: XR WRIST 3+ VW RIGHT  COMPARISON: [facility], CR, XR HAND 3+ VW RIGHT, 8/28/2023, 20:35.  INDICATIONS: FELL COMPLAINS OF RIGHT WRIST AND HAND PAIN WITH BRUISING TO MEDIAL WRIST  FINDINGS:  Dorsal wrist soft tissue swelling is noted.  The posterior lunate fracture noted on the hand x-ray previously is not as well demonstrated by this exam.  Osseous structures otherwise appear unremarkable.  The bones appear diffusely osteopenic.      Impression:   1. Tiny fracture fragment off the posterior aspect of the lunate better noted on the hand radiographs. 2. Dorsal wrist soft tissue swelling 3. Diffuse osteopenia      Garcia Lopez M.D.       Electronically Signed and Approved By: Garcia Lopez M.D. on 8/28/2023 at 21:26             XR Hand 3+ View Right    Result Date: 8/28/2023  Narrative: PROCEDURE: XR HAND 3+ VW RIGHT  COMPARISON: [facility], CR, XR WRIST 3+ VW RIGHT, 8/28/2023, 20:35.  INDICATIONS: FELL COMPLAINS OF RIGHT WRIST AND HAND PAIN WITH BRUISING TO MEDIAL WRIST  FINDINGS:  Dorsal wrist soft tissue swelling is noted.  The bones appear diffusely osteopenic.  There is a 0.3 cm fracture fragment off the posterior aspect of the lunate.  No other fracture is seen.  Alignment is anatomic.      Impression:   1. 0.3 cm fracture fragment off the posterior aspect of the lunate 2. Dorsal wrist soft tissue swelling      Garcia Lopez M.D.       Electronically Signed and Approved By: Garcia Lopez M.D. on 8/28/2023 at 21:25             CT Head Without Contrast    Result Date: 8/28/2023  Narrative: PROCEDURE: CT HEAD WO CONTRAST  COMPARISON:  None INDICATIONS: Trauma altered mental status, fall  PROTOCOL:   Standard imaging protocol performed    RADIATION:   DLP: 1082.2mGy*cm    MA and/or KV was adjusted to minimize radiation dose.     TECHNIQUE: After obtaining the patient's consent, CT images were obtained without non-ionic intravenous contrast material.  FINDINGS:  Mild low density in the cerebral white matter is consistent with small vessel ischemic disease.  The ventricles and cisterns are normal in size and configuration  The visualized extracranial soft tissues appear normal.  Small bilateral mastoid effusions are noted.  No calvarial fracture is identified.      Impression:   1. Mild small vessel ischemic changes in the white matter 2. No calvarial fracture or intracranial hemorrhage 3. Small bilateral mastoid effusions     Garcia Lopez M.D.       Electronically Signed and Approved By: Garcia Lopez M.D. on 8/28/2023 at 18:05             CT Pelvis Without Contrast    Result Date: 8/28/2023  Narrative: PROCEDURE: CT PELVIS WO CONTRAST  COMPARISON: None  INDICATIONS: Trauma, fall bilat hip pain  PROTOCOL:   Standard imaging protocol performed    RADIATION:   DLP: 630.6mGy*cm   Automated exposure control was utilized to minimize radiation dose.  TECHNIQUE: After obtaining the patient's consent, CT images were created without intravenous contrast.  Multiplanar imaging was performed.  FINDINGS:  A dynamic hip screw has been placed on the left.  The distal aspect of the intramedullary james is not imaged on this exam.  There appears to be a largely healed left femoral inter trochanteric fracture.  There is left convexity mid lumbar scoliosis.  There is a vaguely seen transverse fracture of the inferior sacrum bilaterally.  Alignment is near anatomic.  Osseous structures otherwise appear unremarkable.  In the subcutaneous fat lateral to the left iliac wing is a 6.1 cm fluid collection consistent with a hematoma.  Soft tissue contusion is noted more inferiorly.  Visualized pelvic viscera appear normal.      Impression:   1. Nondisplaced transverse fracture of the inferior sacrum  bilaterally.  This is age indeterminate.  Correlate with any previous available pelvic CT results. 2. Previous left femoral inter trochanteric fracture which appears largely healed following open reduction internal fixation 3. Soft tissue hematoma/contusion along the left lateral aspect of the pelvis, as above     Garcia Lopez M.D.       Electronically Signed and Approved By: Garcia Lopez M.D. on 8/28/2023 at 18:25             XR Hip With or Without Pelvis 2 - 3 View Left    Result Date: 8/28/2023  Narrative: PROCEDURE: XR HIP W OR WO PELVIS 2-3 VIEW LEFT  COMPARISON: None  INDICATIONS: LEFT HIP PAIN S/P FALL  FINDINGS:  The sacrum, pelvis, and proximal femurs appear intact.  Bony structures have an osteopenic appearance.  Dynamic screw and medullary james device is in place on the left.  Healed fracture is seen in the intertrochanteric region of the proximal left femur.      Impression:  Left hip series with AP pelvis demonstrating no acute bony abnormality.      PRATIK AGRAWAL MD       Electronically Signed and Approved By: PRATIK AGRAWAL MD on 8/28/2023 at 11:41               Diagnoses and all orders for this visit:    1. Right wrist pain (Primary)  -     XR Wrist 2 View Right    2. Closed fracture of right wrist, initial encounter  -     HYDROcodone-acetaminophen (NORCO) 5-325 MG per tablet; Take 1 tablet by mouth Every 8 (Eight) Hours As Needed for Severe Pain.  Dispense: 20 tablet; Refill: 0    3. Chronic pain syndrome        Discussed the treatment plan with the patient.  I reviewed the x-rays that were obtained today with the patient. Plan to continue short arm cast. Cast care reviewed. Refill of [place] given today.       Will obtain X-Rays of right wrist after cast removal at next visit.     Call or return if worsening symptoms.    Scribed for Jesus Becerra MD by Lynda Hannah  09/18/2023   14:13 EDT         Follow Up       3 weeks    Patient was given instructions and counseling regarding her  condition or for health maintenance advice. Please see specific information pulled into the AVS if appropriate.       I have personally performed the services described in this document as scribed by the above individual and it is both accurate and complete.     Jesus Becerra MD  09/18/23  14:44 EDT

## 2023-10-04 DIAGNOSIS — S62.101A CLOSED FRACTURE OF RIGHT WRIST, INITIAL ENCOUNTER: Primary | ICD-10-CM

## 2023-10-06 ENCOUNTER — HOSPITAL ENCOUNTER (OUTPATIENT)
Dept: GENERAL RADIOLOGY | Facility: HOSPITAL | Age: 78
Discharge: HOME | End: 2023-10-06
Admitting: STUDENT IN AN ORGANIZED HEALTH CARE EDUCATION/TRAINING PROGRAM
Payer: MEDICARE

## 2023-10-06 ENCOUNTER — TELEPHONE (OUTPATIENT)
Dept: ORTHOPEDIC SURGERY | Facility: CLINIC | Age: 78
End: 2023-10-06

## 2023-10-06 ENCOUNTER — OFFICE VISIT (OUTPATIENT)
Dept: ORTHOPEDIC SURGERY | Facility: CLINIC | Age: 78
End: 2023-10-06
Payer: MEDICARE

## 2023-10-06 VITALS — HEART RATE: 51 BPM | HEIGHT: 66 IN | BODY MASS INDEX: 19.44 KG/M2 | WEIGHT: 121 LBS | OXYGEN SATURATION: 95 %

## 2023-10-06 DIAGNOSIS — S62.101A CLOSED FRACTURE OF RIGHT WRIST, INITIAL ENCOUNTER: Primary | ICD-10-CM

## 2023-10-06 DIAGNOSIS — S62.101A CLOSED FRACTURE OF RIGHT WRIST, INITIAL ENCOUNTER: ICD-10-CM

## 2023-10-06 DIAGNOSIS — G89.4 CHRONIC PAIN SYNDROME: ICD-10-CM

## 2023-10-06 PROCEDURE — 73100 X-RAY EXAM OF WRIST: CPT

## 2023-10-06 RX ORDER — ACETAMINOPHEN AND CODEINE PHOSPHATE 300; 30 MG/1; MG/1
1-2 TABLET ORAL EVERY 8 HOURS PRN
Qty: 20 TABLET | Refills: 0 | Status: SHIPPED | OUTPATIENT
Start: 2023-10-06 | End: 2023-10-09 | Stop reason: SDUPTHER

## 2023-10-06 NOTE — PROGRESS NOTES
"Chief Complaint  Pain and Follow-up of the Right Wrist    Subjective          Yign Brock presents to [facility] for   History of Present Illness    Ying returns today for follow-up of her right wrist.  She has a right dorsal lunate fracture that we are treating nonoperatively.  She has been in a short arm cast for over 4 weeks now.  No new injuries.  Pain improving.  Swelling minimal.    No Known Allergies     Social History     Socioeconomic History    Marital status:    Tobacco Use    Smoking status: Never    Smokeless tobacco: Never   Vaping Use    Vaping Use: Never used   Substance and Sexual Activity    Drug use: Never    Sexual activity: Never        I reviewed the patient's chief complaint, history of present illness, review of systems, past medical history, surgical history, family history, social history, medications, and allergy list.     REVIEW OF SYSTEMS    Constitutional: Denies fevers, chills, weight loss  Cardiovascular: Denies chest pain, shortness of breath  Skin: Denies rashes, acute skin changes  Neurologic: Denies headache, loss of consciousness  MSK: Right wrist pain      Objective   Vital Signs:   Pulse 51   Ht 167.6 cm (66\")   Wt 54.9 kg (121 lb)   SpO2 95%   BMI 19.53 kg/m²     Body mass index is 19.53 kg/m².    Physical Exam    General: Alert. No acute distress.   Right upper extremity: Cast removed.  No wounds.  No swelling.  Minimal tenderness.  Stiffness with wrist flexion and extension.  No mechanical symptoms with motion.  Full active finger range of motion.  Palpable radial pulse.  Sensation intact in the hand.    Procedures    Imaging Results (Most Recent)       None                     Assessment and Plan        XR Wrist 2 View Right    Result Date: 9/18/2023  Narrative: Indications: Follow-up right dorsal lunate fracture Views: AP and lateral right wrist Findings: Dorsal lunate fracture difficult to visualize given the overlying cast " material.  All joints appear well aligned.  Scattered degenerative changes are stable. Comparative Data: Comparative data found and reviewed today      Diagnoses and all orders for this visit:    1. Closed fracture of right wrist, initial encounter (Primary)  -     acetaminophen-codeine (TYLENOL/CODEINE #3) 300-30 MG per tablet; Take 1-2 tablets by mouth Every 8 (Eight) Hours As Needed for Moderate Pain.  Dispense: 20 tablet; Refill: 0    2. Chronic pain syndrome        We reviewed her repeat imaging today.  We will transition to wrist bracing and home range of motion exercises for the hand and fingers which were discussed.  She may use the wrist for light activity as tolerated.  Follow-up in approximately 3 weeks for reevaluation.  We will obtain new x-rays of the right wrist when she returns.        Call or return if worsening symptoms.    Scribed for Jesus Becerra MD by Priscilla Peters MA  10/06/2023   08:18 EDT         Follow Up       3 weeks    Patient was given instructions and counseling regarding her condition or for health maintenance advice. Please see specific information pulled into the AVS if appropriate.       I have personally performed the services described in this document as scribed by the above individual and it is both accurate and complete.     Jesus Becerra MD  10/06/23  08:47 EDT

## 2023-10-06 NOTE — TELEPHONE ENCOUNTER
Provider: DR PERALES    Caller: ELIEZER SADLER    Relationship to Patient: SELF    Phone Number: [phone]    Reason for Call: PT NEEDS TO HAVE TYLENOL W/ CODEINE RX SENT TO [facility].

## 2023-10-09 RX ORDER — ACETAMINOPHEN AND CODEINE PHOSPHATE 300; 30 MG/1; MG/1
1-2 TABLET ORAL EVERY 8 HOURS PRN
Qty: 20 TABLET | Refills: 0 | Status: SHIPPED | OUTPATIENT
Start: 2023-10-09

## 2023-10-16 RX ORDER — LISINOPRIL 40 MG/1
40 TABLET ORAL DAILY
Qty: 14 TABLET | OUTPATIENT
Start: 2023-10-16

## 2023-10-18 RX ORDER — LISINOPRIL 40 MG/1
40 TABLET ORAL DAILY
Qty: 14 TABLET | OUTPATIENT
Start: 2023-10-18

## 2023-10-31 ENCOUNTER — TELEPHONE (OUTPATIENT)
Dept: ORTHOPEDIC SURGERY | Facility: CLINIC | Age: 78
End: 2023-10-31
Payer: MEDICARE

## 2023-10-31 DIAGNOSIS — S62.101A CLOSED FRACTURE OF RIGHT WRIST, INITIAL ENCOUNTER: Primary | ICD-10-CM

## 2023-10-31 NOTE — TELEPHONE ENCOUNTER
PATIENT NOTIFIED OF RIGHT WRIST X-RAYS PRIOR TO HER APPOINTMENT ON FRIDAY 11/03/23. PATIENT STATED IT IS VERY HARD FOR HER TO GET TRANSPORTATION. ADVISED PATIENT SHE MAY HAVE X-RAYS DAY OF APPOINTMENT BUT SHE MUST ARRIVE AT LEAST 1 HOUR BEFORE APPOINTMENT TIME TO HAVE X-RAYS DOWNSTAIRS BEFORE COMING UPSTAIRS TO HER APPOINTMENT. PATIENT NOTIFIED IF SHE DOES NOT HAVE X-RAYS BEFORE APPOINTMENT SHE WILL NEED TO RESCHEDULE HER APPOINTMENT. PATIENT VERBALIZED UNDERSTANDING.

## 2023-11-14 ENCOUNTER — TELEPHONE (OUTPATIENT)
Dept: ORTHOPEDIC SURGERY | Facility: CLINIC | Age: 78
End: 2023-11-14
Payer: MEDICARE

## 2023-11-14 DIAGNOSIS — S62.101A CLOSED FRACTURE OF RIGHT WRIST, INITIAL ENCOUNTER: Primary | ICD-10-CM

## 2023-11-14 NOTE — TELEPHONE ENCOUNTER
L/JASON ON  REQUESTING A RETURN CALL REGARDING RIGHT WRIST X-RAYS PRIOR TO HER APPOINTMENT WITH DR PERALES ON 11/17/23. PATIENT MAY GO TO ANY  DIAGNOSTIC CENTER PRIOR TO HER APPOINTMENT WITH DR PERALES ON FRIDAY.